# Patient Record
Sex: FEMALE | Race: WHITE | NOT HISPANIC OR LATINO
[De-identification: names, ages, dates, MRNs, and addresses within clinical notes are randomized per-mention and may not be internally consistent; named-entity substitution may affect disease eponyms.]

---

## 2021-12-29 ENCOUNTER — APPOINTMENT (OUTPATIENT)
Dept: GASTROENTEROLOGY | Facility: CLINIC | Age: 86
End: 2021-12-29
Payer: MEDICARE

## 2021-12-29 VITALS
HEIGHT: 66 IN | BODY MASS INDEX: 24.75 KG/M2 | WEIGHT: 154 LBS | SYSTOLIC BLOOD PRESSURE: 122 MMHG | DIASTOLIC BLOOD PRESSURE: 70 MMHG | HEART RATE: 59 BPM

## 2021-12-29 PROCEDURE — 99203 OFFICE O/P NEW LOW 30 MIN: CPT

## 2021-12-29 NOTE — ASSESSMENT
[FreeTextEntry1] : 89 yo female with history of chronic constipation. Lifestyle modifications discussed with patient and daughter. Will initiate therapy with Linzess 145 micrograms daily, and prn MOM. Follow up in one month.

## 2021-12-29 NOTE — HISTORY OF PRESENT ILLNESS
[de-identified] : Ms. PHONG ALEJO is a 89 year old female with history of chronic constipation. Patient has had issues for many years. Last colonoscopy was ten years ago. There is minimal complaints of bleeding. Patient does note bloating which is progressive as the day goes on. There has been no relief with the use of several laxatives including milk of magnesia and stool softeners. There is no family history of colon cancer.\par

## 2021-12-29 NOTE — REASON FOR VISIT
[Consultation] : a consultation visit [Family Member] : family member [FreeTextEntry1] : constipation

## 2022-03-30 ENCOUNTER — APPOINTMENT (OUTPATIENT)
Dept: GASTROENTEROLOGY | Facility: CLINIC | Age: 87
End: 2022-03-30
Payer: SELF-PAY

## 2022-03-30 DIAGNOSIS — K59.09 OTHER CONSTIPATION: ICD-10-CM

## 2022-03-30 PROCEDURE — 99442: CPT | Mod: 95

## 2022-03-30 NOTE — REASON FOR VISIT
[Home] : at home, [unfilled] , at the time of the visit. [Medical Office: ([facility])___] : at the medical office located in  [Verbal consent obtained from patient] : the patient, [unfilled] [Follow-Up: _____] : a [unfilled] follow-up visit

## 2022-03-30 NOTE — HISTORY OF PRESENT ILLNESS
[de-identified] : Ms. PHONG ALEJO is a 89 year old female with history of chronic constipation. Patient was intolerant of Linzess as it resulted in diarrhea. Patient was started on Amitiza, and has noted improvement and constipation although she did have one episode of diarrhea. There has been no bleeding or weight loss. Appetite has been good. \par

## 2022-03-30 NOTE — ASSESSMENT
[FreeTextEntry1] : 89 yo female with history of chronic constipation. Will adjust Amitiza to once daily. Patient advised to increase water and fiber intake. Patient to call back if diarrhea develops.\par \par Eleven minutes were spent in telephone consultation with patient.

## 2023-03-21 ENCOUNTER — INPATIENT (INPATIENT)
Facility: HOSPITAL | Age: 88
LOS: 2 days | Discharge: HOME HEALTH | DRG: 605 | End: 2023-03-24
Attending: NEUROLOGICAL SURGERY | Admitting: NEUROLOGICAL SURGERY
Payer: MEDICARE

## 2023-03-21 VITALS
OXYGEN SATURATION: 94 % | HEART RATE: 82 BPM | SYSTOLIC BLOOD PRESSURE: 180 MMHG | DIASTOLIC BLOOD PRESSURE: 76 MMHG | TEMPERATURE: 99 F | RESPIRATION RATE: 18 BRPM

## 2023-03-21 LAB
ALBUMIN SERPL ELPH-MCNC: 3.2 G/DL — LOW (ref 3.3–5)
ALP SERPL-CCNC: 61 U/L — SIGNIFICANT CHANGE UP (ref 40–120)
ALT FLD-CCNC: 9 U/L — LOW (ref 12–78)
ANION GAP SERPL CALC-SCNC: 7 MMOL/L — SIGNIFICANT CHANGE UP (ref 5–17)
APTT BLD: 29 SEC — SIGNIFICANT CHANGE UP (ref 27.5–35.5)
AST SERPL-CCNC: 21 U/L — SIGNIFICANT CHANGE UP (ref 15–37)
BASOPHILS # BLD AUTO: 0.04 K/UL — SIGNIFICANT CHANGE UP (ref 0–0.2)
BASOPHILS NFR BLD AUTO: 0.4 % — SIGNIFICANT CHANGE UP (ref 0–2)
BILIRUB SERPL-MCNC: 0.4 MG/DL — SIGNIFICANT CHANGE UP (ref 0.2–1.2)
BLD GP AB SCN SERPL QL: SIGNIFICANT CHANGE UP
BUN SERPL-MCNC: 21 MG/DL — SIGNIFICANT CHANGE UP (ref 7–23)
CALCIUM SERPL-MCNC: 8.9 MG/DL — SIGNIFICANT CHANGE UP (ref 8.5–10.1)
CHLORIDE SERPL-SCNC: 108 MMOL/L — SIGNIFICANT CHANGE UP (ref 96–108)
CO2 SERPL-SCNC: 26 MMOL/L — SIGNIFICANT CHANGE UP (ref 22–31)
CREAT SERPL-MCNC: 0.79 MG/DL — SIGNIFICANT CHANGE UP (ref 0.5–1.3)
EGFR: 70 ML/MIN/1.73M2 — SIGNIFICANT CHANGE UP
EOSINOPHIL # BLD AUTO: 0.05 K/UL — SIGNIFICANT CHANGE UP (ref 0–0.5)
EOSINOPHIL NFR BLD AUTO: 0.5 % — SIGNIFICANT CHANGE UP (ref 0–6)
GLUCOSE SERPL-MCNC: 100 MG/DL — HIGH (ref 70–99)
HCT VFR BLD CALC: 38.7 % — SIGNIFICANT CHANGE UP (ref 34.5–45)
HGB BLD-MCNC: 12.2 G/DL — SIGNIFICANT CHANGE UP (ref 11.5–15.5)
IMM GRANULOCYTES NFR BLD AUTO: 0.6 % — SIGNIFICANT CHANGE UP (ref 0–0.9)
INR BLD: 1.02 RATIO — SIGNIFICANT CHANGE UP (ref 0.88–1.16)
LYMPHOCYTES # BLD AUTO: 1.03 K/UL — SIGNIFICANT CHANGE UP (ref 1–3.3)
LYMPHOCYTES # BLD AUTO: 9.8 % — LOW (ref 13–44)
MCHC RBC-ENTMCNC: 29.5 PG — SIGNIFICANT CHANGE UP (ref 27–34)
MCHC RBC-ENTMCNC: 31.5 GM/DL — LOW (ref 32–36)
MCV RBC AUTO: 93.5 FL — SIGNIFICANT CHANGE UP (ref 80–100)
MONOCYTES # BLD AUTO: 0.8 K/UL — SIGNIFICANT CHANGE UP (ref 0–0.9)
MONOCYTES NFR BLD AUTO: 7.6 % — SIGNIFICANT CHANGE UP (ref 2–14)
NEUTROPHILS # BLD AUTO: 8.54 K/UL — HIGH (ref 1.8–7.4)
NEUTROPHILS NFR BLD AUTO: 81.1 % — HIGH (ref 43–77)
PLATELET # BLD AUTO: 192 K/UL — SIGNIFICANT CHANGE UP (ref 150–400)
POTASSIUM SERPL-MCNC: 4.7 MMOL/L — SIGNIFICANT CHANGE UP (ref 3.5–5.3)
POTASSIUM SERPL-SCNC: 4.7 MMOL/L — SIGNIFICANT CHANGE UP (ref 3.5–5.3)
PROT SERPL-MCNC: 7.8 GM/DL — SIGNIFICANT CHANGE UP (ref 6–8.3)
PROTHROM AB SERPL-ACNC: 11.8 SEC — SIGNIFICANT CHANGE UP (ref 10.5–13.4)
RBC # BLD: 4.14 M/UL — SIGNIFICANT CHANGE UP (ref 3.8–5.2)
RBC # FLD: 14.4 % — SIGNIFICANT CHANGE UP (ref 10.3–14.5)
SODIUM SERPL-SCNC: 141 MMOL/L — SIGNIFICANT CHANGE UP (ref 135–145)
WBC # BLD: 10.52 K/UL — HIGH (ref 3.8–10.5)
WBC # FLD AUTO: 10.52 K/UL — HIGH (ref 3.8–10.5)

## 2023-03-21 PROCEDURE — 71250 CT THORAX DX C-: CPT | Mod: 26,MA

## 2023-03-21 PROCEDURE — 76376 3D RENDER W/INTRP POSTPROCES: CPT | Mod: 26

## 2023-03-21 PROCEDURE — 72125 CT NECK SPINE W/O DYE: CPT | Mod: 26,MA

## 2023-03-21 PROCEDURE — 70450 CT HEAD/BRAIN W/O DYE: CPT | Mod: 26,MA

## 2023-03-21 PROCEDURE — 74176 CT ABD & PELVIS W/O CONTRAST: CPT | Mod: 26,MA

## 2023-03-21 PROCEDURE — 73552 X-RAY EXAM OF FEMUR 2/>: CPT | Mod: 26,LT

## 2023-03-21 PROCEDURE — 93010 ELECTROCARDIOGRAM REPORT: CPT

## 2023-03-21 PROCEDURE — 99285 EMERGENCY DEPT VISIT HI MDM: CPT | Mod: 25

## 2023-03-21 PROCEDURE — 73562 X-RAY EXAM OF KNEE 3: CPT | Mod: 26,LT

## 2023-03-21 PROCEDURE — 12011 RPR F/E/E/N/L/M 2.5 CM/<: CPT

## 2023-03-21 PROCEDURE — 70486 CT MAXILLOFACIAL W/O DYE: CPT | Mod: 26,MA

## 2023-03-21 PROCEDURE — 73590 X-RAY EXAM OF LOWER LEG: CPT | Mod: 26,LT

## 2023-03-21 RX ORDER — ACETAMINOPHEN 500 MG
1000 TABLET ORAL ONCE
Refills: 0 | Status: COMPLETED | OUTPATIENT
Start: 2023-03-21 | End: 2023-03-21

## 2023-03-21 RX ORDER — TETANUS TOXOID, REDUCED DIPHTHERIA TOXOID AND ACELLULAR PERTUSSIS VACCINE, ADSORBED 5; 2.5; 8; 8; 2.5 [IU]/.5ML; [IU]/.5ML; UG/.5ML; UG/.5ML; UG/.5ML
0.5 SUSPENSION INTRAMUSCULAR ONCE
Refills: 0 | Status: COMPLETED | OUTPATIENT
Start: 2023-03-21 | End: 2023-03-21

## 2023-03-21 RX ADMIN — Medication 1000 MILLIGRAM(S): at 23:35

## 2023-03-21 RX ADMIN — Medication 400 MILLIGRAM(S): at 22:50

## 2023-03-21 NOTE — ED PROVIDER NOTE - PHYSICAL EXAMINATION
Physical Exam:  Gen: NAD, non-toxic appearing, able to ambulate without assistance  Head: NCAT  HEENT: EOMI, PEERLA, normal conjunctiva, tongue midline, oral mucosa moist  Lung: CTAB, no respiratory distress, no wheezes/rhonchi/rales B/L, speaking in full sentences  CV: RRR, no murmurs, rubs or gallops, distal pulses 2+ b/l  Abd: soft, nontender, no distention, no guarding, no rigidity, no rebound tenderness  MSK: +b/l chest wall TTP. No crepitance. +L knee abrasion with TTP, mild swelling, and decreased ROM secondary to pain.  Skin: Warm, well perfused, no rash. +bruising to nasal bridge. +1cm laceration to chin, no bleeding, no foreign body.   Psych: normal affect, calm

## 2023-03-21 NOTE — ED PROVIDER NOTE - CLINICAL SUMMARY MEDICAL DECISION MAKING FREE TEXT BOX
Patient with head injury, small hyperdense area small hemorrhage vs cavernova,  xr knee without fracture, CTs otherwise show evidence of metastatic disease.  Laceration repaired in the ED.  patient ultimately admitted to neuro surgery service under SICU.

## 2023-03-21 NOTE — ED PROVIDER NOTE - PROGRESS NOTE DETAILS
Juan LAM: s/o received from Dr. Simmons, pending CT reads. all CTs and findings d/w patient and family at bedside. NSx eval obtained for possible hemorrhage in Laura. recommending admission and rept CT head in 6 hours from initial. will admit.

## 2023-03-21 NOTE — ED ADULT TRIAGE NOTE - CHIEF COMPLAINT QUOTE
pt bibems  from home s/p trip and fall from standing height onto face on concrete at 3:30pm. -loc -blood thinner. abrasion to bridge of nose and lips. pmh vertigo, parkinson's. pt c/o L knee pain upon ambulation

## 2023-03-21 NOTE — ED ADULT NURSE REASSESSMENT NOTE - NS ED NURSE REASSESS COMMENT FT1
Pt assessed at bedside, per MD Simmons, made a trauma alert. Pt taken to trauma room, placed on telemetry monitor, pt assessed per trauma flowsheet charting.

## 2023-03-21 NOTE — ED PROVIDER NOTE - CARE PLAN
1 Principal Discharge DX:	Head injury   Principal Discharge DX:	Head injury  Secondary Diagnosis:	Facial laceration  Secondary Diagnosis:	Knee pain, left  Secondary Diagnosis:	Metastatic disease

## 2023-03-21 NOTE — ED PROVIDER NOTE - OBJECTIVE STATEMENT
89 y/o female PMHx of vertigo, parkinson's presents to ED bibems  from home s/p mechanical trip and fall from standing height onto face on concrete at 3:30pm c/o face pain, L knee pain, b/l chest wall pain. -loc, -blood thinner. +abrasion to bridge of nose and lips. 89 y/o female PMHx of vertigo, parkinson's presents to ED bibems  from home s/p mechanical trip and fall from standing height onto face on concrete at 3:30pm today c/o face pain, L knee pain, b/l chest wall pain. -loc, -blood thinner. +abrasion to bridge of nose and lips.

## 2023-03-22 DIAGNOSIS — S09.90XA UNSPECIFIED INJURY OF HEAD, INITIAL ENCOUNTER: ICD-10-CM

## 2023-03-22 LAB
ANION GAP SERPL CALC-SCNC: 2 MMOL/L — LOW (ref 5–17)
APPEARANCE UR: CLEAR — SIGNIFICANT CHANGE UP
BACTERIA # UR AUTO: ABNORMAL
BILIRUB UR-MCNC: NEGATIVE — SIGNIFICANT CHANGE UP
BUN SERPL-MCNC: 18 MG/DL — SIGNIFICANT CHANGE UP (ref 7–23)
CALCIUM SERPL-MCNC: 9 MG/DL — SIGNIFICANT CHANGE UP (ref 8.5–10.1)
CHLORIDE SERPL-SCNC: 107 MMOL/L — SIGNIFICANT CHANGE UP (ref 96–108)
CO2 SERPL-SCNC: 30 MMOL/L — SIGNIFICANT CHANGE UP (ref 22–31)
COLOR SPEC: YELLOW — SIGNIFICANT CHANGE UP
CREAT SERPL-MCNC: 0.84 MG/DL — SIGNIFICANT CHANGE UP (ref 0.5–1.3)
DIFF PNL FLD: ABNORMAL
EGFR: 65 ML/MIN/1.73M2 — SIGNIFICANT CHANGE UP
EPI CELLS # UR: SIGNIFICANT CHANGE UP
FLUAV AG NPH QL: SIGNIFICANT CHANGE UP
FLUBV AG NPH QL: SIGNIFICANT CHANGE UP
GLUCOSE SERPL-MCNC: 109 MG/DL — HIGH (ref 70–99)
GLUCOSE UR QL: NEGATIVE — SIGNIFICANT CHANGE UP
HCT VFR BLD CALC: 38 % — SIGNIFICANT CHANGE UP (ref 34.5–45)
HGB BLD-MCNC: 12.2 G/DL — SIGNIFICANT CHANGE UP (ref 11.5–15.5)
KETONES UR-MCNC: NEGATIVE — SIGNIFICANT CHANGE UP
LEUKOCYTE ESTERASE UR-ACNC: ABNORMAL
MAGNESIUM SERPL-MCNC: 2.3 MG/DL — SIGNIFICANT CHANGE UP (ref 1.6–2.6)
MCHC RBC-ENTMCNC: 29.6 PG — SIGNIFICANT CHANGE UP (ref 27–34)
MCHC RBC-ENTMCNC: 32.1 GM/DL — SIGNIFICANT CHANGE UP (ref 32–36)
MCV RBC AUTO: 92.2 FL — SIGNIFICANT CHANGE UP (ref 80–100)
NITRITE UR-MCNC: NEGATIVE — SIGNIFICANT CHANGE UP
PH UR: 6.5 — SIGNIFICANT CHANGE UP (ref 5–8)
PHOSPHATE SERPL-MCNC: 3.4 MG/DL — SIGNIFICANT CHANGE UP (ref 2.5–4.5)
PLATELET # BLD AUTO: 200 K/UL — SIGNIFICANT CHANGE UP (ref 150–400)
POTASSIUM SERPL-MCNC: 4 MMOL/L — SIGNIFICANT CHANGE UP (ref 3.5–5.3)
POTASSIUM SERPL-SCNC: 4 MMOL/L — SIGNIFICANT CHANGE UP (ref 3.5–5.3)
PROT UR-MCNC: 15
RBC # BLD: 4.12 M/UL — SIGNIFICANT CHANGE UP (ref 3.8–5.2)
RBC # FLD: 14.2 % — SIGNIFICANT CHANGE UP (ref 10.3–14.5)
RBC CASTS # UR COMP ASSIST: ABNORMAL /HPF (ref 0–4)
RSV RNA NPH QL NAA+NON-PROBE: SIGNIFICANT CHANGE UP
SARS-COV-2 RNA SPEC QL NAA+PROBE: SIGNIFICANT CHANGE UP
SODIUM SERPL-SCNC: 139 MMOL/L — SIGNIFICANT CHANGE UP (ref 135–145)
SP GR SPEC: 1.01 — SIGNIFICANT CHANGE UP (ref 1.01–1.02)
UROBILINOGEN FLD QL: NEGATIVE — SIGNIFICANT CHANGE UP
WBC # BLD: 9.77 K/UL — SIGNIFICANT CHANGE UP (ref 3.8–10.5)
WBC # FLD AUTO: 9.77 K/UL — SIGNIFICANT CHANGE UP (ref 3.8–10.5)
WBC UR QL: SIGNIFICANT CHANGE UP /HPF (ref 0–5)

## 2023-03-22 PROCEDURE — 93306 TTE W/DOPPLER COMPLETE: CPT | Mod: 26

## 2023-03-22 PROCEDURE — 97116 GAIT TRAINING THERAPY: CPT | Mod: GP

## 2023-03-22 PROCEDURE — 99222 1ST HOSP IP/OBS MODERATE 55: CPT

## 2023-03-22 PROCEDURE — 85027 COMPLETE CBC AUTOMATED: CPT

## 2023-03-22 PROCEDURE — 97530 THERAPEUTIC ACTIVITIES: CPT | Mod: GP

## 2023-03-22 PROCEDURE — 99223 1ST HOSP IP/OBS HIGH 75: CPT

## 2023-03-22 PROCEDURE — 70553 MRI BRAIN STEM W/O & W/DYE: CPT

## 2023-03-22 PROCEDURE — A9579: CPT

## 2023-03-22 PROCEDURE — 80061 LIPID PANEL: CPT

## 2023-03-22 PROCEDURE — 84100 ASSAY OF PHOSPHORUS: CPT

## 2023-03-22 PROCEDURE — 83735 ASSAY OF MAGNESIUM: CPT

## 2023-03-22 PROCEDURE — 97162 PT EVAL MOD COMPLEX 30 MIN: CPT | Mod: GP

## 2023-03-22 PROCEDURE — 80048 BASIC METABOLIC PNL TOTAL CA: CPT

## 2023-03-22 PROCEDURE — 74183 MRI ABD W/O CNTR FLWD CNTR: CPT

## 2023-03-22 PROCEDURE — 36415 COLL VENOUS BLD VENIPUNCTURE: CPT

## 2023-03-22 PROCEDURE — 70450 CT HEAD/BRAIN W/O DYE: CPT | Mod: 26

## 2023-03-22 PROCEDURE — 93306 TTE W/DOPPLER COMPLETE: CPT

## 2023-03-22 PROCEDURE — 70450 CT HEAD/BRAIN W/O DYE: CPT

## 2023-03-22 RX ORDER — MIRTAZAPINE 45 MG/1
7.5 TABLET, ORALLY DISINTEGRATING ORAL AT BEDTIME
Refills: 0 | Status: DISCONTINUED | OUTPATIENT
Start: 2023-03-22 | End: 2023-03-24

## 2023-03-22 RX ORDER — CARBIDOPA AND LEVODOPA 25; 100 MG/1; MG/1
1.5 TABLET ORAL
Refills: 0 | Status: DISCONTINUED | OUTPATIENT
Start: 2023-03-22 | End: 2023-03-24

## 2023-03-22 RX ORDER — FAMOTIDINE 10 MG/ML
20 INJECTION INTRAVENOUS EVERY 12 HOURS
Refills: 0 | Status: DISCONTINUED | OUTPATIENT
Start: 2023-03-22 | End: 2023-03-24

## 2023-03-22 RX ORDER — SERTRALINE 25 MG/1
75 TABLET, FILM COATED ORAL DAILY
Refills: 0 | Status: DISCONTINUED | OUTPATIENT
Start: 2023-03-22 | End: 2023-03-24

## 2023-03-22 RX ORDER — CARBIDOPA AND LEVODOPA 25; 100 MG/1; MG/1
2 TABLET ORAL
Refills: 0 | Status: DISCONTINUED | OUTPATIENT
Start: 2023-03-22 | End: 2023-03-24

## 2023-03-22 RX ORDER — CARBIDOPA AND LEVODOPA 25; 100 MG/1; MG/1
2 TABLET ORAL
Qty: 0 | Refills: 0 | DISCHARGE

## 2023-03-22 RX ORDER — SENNA PLUS 8.6 MG/1
2 TABLET ORAL AT BEDTIME
Refills: 0 | Status: DISCONTINUED | OUTPATIENT
Start: 2023-03-22 | End: 2023-03-24

## 2023-03-22 RX ORDER — FOLIC ACID 0.8 MG
1 TABLET ORAL DAILY
Refills: 0 | Status: DISCONTINUED | OUTPATIENT
Start: 2023-03-22 | End: 2023-03-24

## 2023-03-22 RX ORDER — CARBIDOPA AND LEVODOPA 25; 100 MG/1; MG/1
1 TABLET ORAL
Qty: 0 | Refills: 0 | DISCHARGE

## 2023-03-22 RX ORDER — ONDANSETRON 8 MG/1
4 TABLET, FILM COATED ORAL EVERY 6 HOURS
Refills: 0 | Status: DISCONTINUED | OUTPATIENT
Start: 2023-03-22 | End: 2023-03-24

## 2023-03-22 RX ORDER — ACETAMINOPHEN 500 MG
1000 TABLET ORAL EVERY 6 HOURS
Refills: 0 | Status: DISCONTINUED | OUTPATIENT
Start: 2023-03-22 | End: 2023-03-24

## 2023-03-22 RX ORDER — SODIUM CHLORIDE 9 MG/ML
1000 INJECTION INTRAMUSCULAR; INTRAVENOUS; SUBCUTANEOUS
Refills: 0 | Status: DISCONTINUED | OUTPATIENT
Start: 2023-03-22 | End: 2023-03-23

## 2023-03-22 RX ADMIN — FAMOTIDINE 20 MILLIGRAM(S): 10 INJECTION INTRAVENOUS at 22:47

## 2023-03-22 RX ADMIN — Medication 1 MILLIGRAM(S): at 10:28

## 2023-03-22 RX ADMIN — MIRTAZAPINE 7.5 MILLIGRAM(S): 45 TABLET, ORALLY DISINTEGRATING ORAL at 23:27

## 2023-03-22 RX ADMIN — SODIUM CHLORIDE 75 MILLILITER(S): 9 INJECTION INTRAMUSCULAR; INTRAVENOUS; SUBCUTANEOUS at 23:28

## 2023-03-22 RX ADMIN — CARBIDOPA AND LEVODOPA 2 TABLET(S): 25; 100 TABLET ORAL at 19:31

## 2023-03-22 RX ADMIN — CARBIDOPA AND LEVODOPA 1.5 TABLET(S): 25; 100 TABLET ORAL at 13:18

## 2023-03-22 RX ADMIN — SODIUM CHLORIDE 75 MILLILITER(S): 9 INJECTION INTRAMUSCULAR; INTRAVENOUS; SUBCUTANEOUS at 06:40

## 2023-03-22 RX ADMIN — CARBIDOPA AND LEVODOPA 1.5 TABLET(S): 25; 100 TABLET ORAL at 22:48

## 2023-03-22 RX ADMIN — Medication 1 TABLET(S): at 10:28

## 2023-03-22 RX ADMIN — SENNA PLUS 2 TABLET(S): 8.6 TABLET ORAL at 22:47

## 2023-03-22 RX ADMIN — FAMOTIDINE 20 MILLIGRAM(S): 10 INJECTION INTRAVENOUS at 10:27

## 2023-03-22 RX ADMIN — SERTRALINE 75 MILLIGRAM(S): 25 TABLET, FILM COATED ORAL at 21:06

## 2023-03-22 NOTE — H&P ADULT - HISTORY OF PRESENT ILLNESS
Neurosurgery called 1:27am  Arrival to patient 1:40am    Patient is a 89 y/o female PMHx of vertigo, parkinson's presents to ED bibems  from home s/p mechanical trip and fall from standing height onto face on concrete at 3:30pm today c/o face pain, L knee pain, b/l chest wall pain. She denies LOC. GCS 15 E:4 M:6 V:5. She states she has a history of frequent falls due to her vertigo. Typically ambulates with walker but did not have it with her when she was outside. Patient sitting up in stretcher, complains of left knee pain. She denies numbness/tingling, weakness, visual changes, dizziness.

## 2023-03-22 NOTE — PHYSICAL THERAPY INITIAL EVALUATION ADULT - GENERAL OBSERVATIONS, REHAB EVAL
Pt rec'd supine in bed, pleasant and cooperative with PT, no c/o pain but endorses left knee stiffness.

## 2023-03-22 NOTE — H&P ADULT - NSHPPHYSICALEXAM_GEN_ALL_CORE
Constitutional: awake and alert.  Eyes: anicteric sclerae, moist conjunctivae, PERRLA  HENT: + lac chin sutured in ER c/d/i, dried blood to upper lip and nares, oropharynx clear dry mucous membranes  Neck: Supple  Respiratory: Breath sounds are clear bilaterally  Cardiovascular: S1 and S2, regular  rhythm  Gastrointestinal: soft, nontender  Extremities:  no edema  Musculoskeletal: + TTP L knee with ROM no erythema small abrasion noted without bleeding  Skin: No rashes  Psych: Appropriately interactive, normal affect    Neurological exam:  HF: A x O x 3. Appropriately interactive, normal affect. Speech fluent, No Aphasia or paraphasic errors. Naming /repetition intact   CN: LATRICIA, EOMI, VFF, facial sensation normal, no NLFD, tongue midline  Motor: No pronator drift, Strength 5/5 except bl LE lifts antigravity off stretcher  Sens: Intact to light touch  Reflexes: Symmetric and normal, downgoing toes b/l  Coord:  No FNFA, dysmetria, KEITH intact   Gait/Balance: Cannot test No

## 2023-03-22 NOTE — CONSULT NOTE ADULT - ASSESSMENT
89 y/o F with PMHx of vertigo and Parkinson's found to have numerous lung nodules and hepatic lesion on CT A/P s/p mechanical trip and fall with head trauma    # Scattered Bilateral Pulmonary Nodules & Hepatic Lesions    - CT A/P imaging revealed: Numerous scattered bilateral pulmonary nodules suspicious for metastatic disease. The largest is in the left lower lobe and measures 1.7 cm. Next largest is in the right upper lobe and measures 1.1 cm. There are several hepatic lesions which are indeterminant. Suspicious for metastatic disease.  - Planning to receive MR abdomen and head with/without contrast  - Recommend IR consult for anticipated tissue bx  - Will speak with patient's daughter regarding GOC as patient agreeable to full work up to identify diagnosis  - Continue supportive measures  89 y/o F with PMHx of vertigo and Parkinson's found to have numerous lung nodules and hepatic lesion on CT A/P s/p mechanical trip and fall with head trauma    # Scattered Bilateral Pulmonary Nodules & Hepatic Lesions    - CT A/P imaging revealed: Numerous scattered bilateral pulmonary nodules suspicious for metastatic disease. The largest is in the left lower lobe and measures 1.7 cm. Next largest is in the right upper lobe and measures 1.1 cm. There are several hepatic lesions which are indeterminant. Suspicious for metastatic disease.  - Obtain MRI of abdomen with contrast  to better evaluate liver lesions r/o mets. If MRI confirms that hepatic lesions likely malignant- will need liver biopsy ( prefer rather than lung biopsy)- if patient desires to proceed with diagnostic w/up.   - Recommend IR consult for anticipated tissue bx  - Will speak with patient's daughter regarding GOC as patient agreeable to full work up to identify diagnosis  - Continue supportive measures     Hem Onc attending.   Patient evaluated, imaging reviewed Discussed with Hem Onc PA. Plan as above to obtain additional imaging and diagnostic biopsy if patient wants  to proceed .

## 2023-03-22 NOTE — H&P ADULT - ASSESSMENT
Patient is a 89 y/o female PMHx of vertigo, parkinson's presents to ED bibems  from home s/p mechanical trip and fall from standing height onto face on concrete at 3:30pm today c/o face pain, L knee pain, b/l chest wall pain. HCT revealed 5mm focus hyperdensity anterior stella, hemorrhage vs cavernoma. No other traumatic injuries.    Plan:  - No acute neurosurgical intervention  - Admit for observation stepdown  - Neuro checks [id number]  - RHCT this AM or sooner if acute change in MS  - Patient defers MRI Brain   - SBP <150  - Pain control  - CT C/A/P multiple hepatic lung nodule per patient/daughter has been aware of may need to perform with contrast  - Critical care consult  - SCDs DVT ppx    Discussed with Dr. Mack

## 2023-03-22 NOTE — PATIENT PROFILE ADULT - FALL HARM RISK - HARM RISK INTERVENTIONS

## 2023-03-22 NOTE — H&P ADULT - NSHPLABSRESULTS_GEN_ALL_CORE
CT Head No Cont (03.21.23 @ 22:27)   Nonspecific 5 mm focus of increased density within the anterior stella   without significant adjacent edema or mass effect. This may represent a   small focal hemorrhage versus a cavernoma. Short-term follow-up CT can be   obtained. Alternatively, further evaluation with MRI of the brain can be   obtained.    Otherwise, there is no other evidence for intracranial bleed, mass effect   or depressed skull fracture.      CT FACIAL BONES: No acute fracture or acute dislocation.    Question collapse of the left posterior nasopharynx, however underlying   mass cannot be excluded. Recommend direct inspection.      CT CERVICAL SPINE: No acute fracture or acute subluxation.

## 2023-03-22 NOTE — PROGRESS NOTE ADULT - ASSESSMENT
Patient is a 89 y/o female PMHx of vertigo, parkinson's presents to ED bibems  from home s/p mechanical trip and fall from standing height onto face on concrete at 3:30pm today c/o face pain, L knee pain, b/l chest wall pain. HCT revealed 5mm focus hyperdensity anterior laura, hemorrhage vs cavernoma. No other traumatic injuries.    # Anterior Laura Hemorrhage   # b/l lung lesions  # Liver lesions   # Parkinson's  # Vertigo     Plan:  Advance diet and activity as tolerated   Pt has agreed to MR +/- brain, chest, abd with sedation for diagnostic purposes   Daughter is requesting heme/onc consult   check orthostatic BP   Physical therapy   Needs stroke core measures: TTE, Lipids, DVT PPX, dysphagia screen   Venodynes for DVT PPX         Discussed with Dr. Mack

## 2023-03-22 NOTE — CONSULT NOTE ADULT - ASSESSMENT
ASSESSMENT  89 yo female PMHx of vertigo, parkinson's presents to ED bibems  from home s/p mechanical trip and fall from standing height onto face on concrete at 3:30pm today c/o face pain, L knee pain, b/l chest wall pain. She denies LOC.    CTH with 5mm hyperdensity in anterior stella hemorrhage vs cavernona  Rpt CTH stable  CTAP revealing multiple pulmonary nodules suspicious for metastasis, also liver lesions    PLAN  - Mentation intact. rpt CTH stable. no neurosx intervention  - BP stable. cont to monitor  - check orthostatics  - check TTE  - incidental findings of mulitple pulm and liver lesions suspicious for metastasis. pt and daughter Joy denies hx of cancer, unintentional weight loss, hemoptysis, state pt quit smoking when she was 26yo. daughter denies that pt had any abnl mammograms, colonoscopies but not completely sure.  - daughter requesting heme/onc consult  - will get further imaging MR head, abdomen and CT chest with IV contrast to further eval nodules  - PO diet  - Cr stable. monitor I & os  - no chemical DVT ppx d/t bleeding risk  - PT eval for frequent falls    Will discuss with Dr. Barajas

## 2023-03-22 NOTE — PHYSICAL THERAPY INITIAL EVALUATION ADULT - PERTINENT HX OF CURRENT PROBLEM, REHAB EVAL
89 y/o female PMHx of vertigo, parkinson's presents to ED bibems  from home s/p mechanical trip and fall from standing height onto face on concrete at 3:30pm today c/o face pain, L knee pain, b/l chest wall pain. HCT revealed 5mm focus hyperdensity anterior stella, hemorrhage vs cavernoma.

## 2023-03-22 NOTE — PHYSICAL THERAPY INITIAL EVALUATION ADULT - STANDING BALANCE: DYNAMIC, REHAB EVAL
Herberth returned call. Agreeable to ECG today, will head over now. Also discussed to stop at outpatient imaging on first floor for lab draw after ECG is completed.  
Last office visit: 4/14/22    Next scheduled/on recall list: Neither - due for 6 month follow up (~10/2022) for monitoring of Dofetilide therapy    Medication/dose: Dofetilide 500 mg BID    Quantity/#refills: 180/0     Medication related testing: CMP 1/17/23, Mag 4/14/22, 4/14/22    Refill request completed? No        Spoke with patient, states he has a little less than a month of medication left. Agreeable to being transferred to schedule follow up. Aware we are booking out several months so depending on when follow up is scheduled will fill medication depending on this visit. May need lab work / EKG in the meantime since magnesium and EKG were last in April 2022.     Scheduled with Rosaura PRAJAPATI 3/29/23.       Rosaura - okay to order magnesium and EKG?       EP triage - once magnesium and EKG are completed, as long as they are stable, please fill Dofetilide for 180/0 to get patient to follow up appointment. Patient states he has a little less than a month of medication left, aware of  importance of not missing any doses.     
Looks good, follow-up as scheduled  
Mag normal also  
Mg and EKG orders placed.   Call to PAVEL Kevin #1.     With returned call please get in for ASAP EKG per below and advise on Mg level. These orders are placed. When he schedules the EKG- ok to send refill in per MS below.  
Pura: ECG placed on your desk for dofetilide monitoring.   
Refill request fulfilled. Writer did not call patient with results or enter edit EKG as not in EP clinic today.   
Relayed ECG and mag results to Valentina   
Yes, please order magnesium level (unfortunately it is too late to add on to recent labs).  Also obtain ECG.  I do not want him to miss any doses, hopefully he can get in ASAP.  If he has mail order medications, I am okay with you sending in enough to get him through into the appointment, even prior to testing being completed.  Magnesium was stable in the past.  We can always call and make any changes if needed.  
fair plus

## 2023-03-23 LAB
ANION GAP SERPL CALC-SCNC: 5 MMOL/L — SIGNIFICANT CHANGE UP (ref 5–17)
BUN SERPL-MCNC: 15 MG/DL — SIGNIFICANT CHANGE UP (ref 7–23)
CALCIUM SERPL-MCNC: 8.5 MG/DL — SIGNIFICANT CHANGE UP (ref 8.5–10.1)
CHLORIDE SERPL-SCNC: 113 MMOL/L — HIGH (ref 96–108)
CHOLEST SERPL-MCNC: 143 MG/DL — SIGNIFICANT CHANGE UP
CO2 SERPL-SCNC: 25 MMOL/L — SIGNIFICANT CHANGE UP (ref 22–31)
CREAT SERPL-MCNC: 0.64 MG/DL — SIGNIFICANT CHANGE UP (ref 0.5–1.3)
CULTURE RESULTS: SIGNIFICANT CHANGE UP
EGFR: 83 ML/MIN/1.73M2 — SIGNIFICANT CHANGE UP
GLUCOSE SERPL-MCNC: 89 MG/DL — SIGNIFICANT CHANGE UP (ref 70–99)
HCT VFR BLD CALC: 36.7 % — SIGNIFICANT CHANGE UP (ref 34.5–45)
HDLC SERPL-MCNC: 47 MG/DL — LOW
HGB BLD-MCNC: 11.7 G/DL — SIGNIFICANT CHANGE UP (ref 11.5–15.5)
LIPID PNL WITH DIRECT LDL SERPL: 81 MG/DL — SIGNIFICANT CHANGE UP
MAGNESIUM SERPL-MCNC: 2.2 MG/DL — SIGNIFICANT CHANGE UP (ref 1.6–2.6)
MCHC RBC-ENTMCNC: 29.8 PG — SIGNIFICANT CHANGE UP (ref 27–34)
MCHC RBC-ENTMCNC: 31.9 GM/DL — LOW (ref 32–36)
MCV RBC AUTO: 93.4 FL — SIGNIFICANT CHANGE UP (ref 80–100)
NON HDL CHOLESTEROL: 96 MG/DL — SIGNIFICANT CHANGE UP
PHOSPHATE SERPL-MCNC: 2.8 MG/DL — SIGNIFICANT CHANGE UP (ref 2.5–4.5)
PLATELET # BLD AUTO: 180 K/UL — SIGNIFICANT CHANGE UP (ref 150–400)
POTASSIUM SERPL-MCNC: 3.7 MMOL/L — SIGNIFICANT CHANGE UP (ref 3.5–5.3)
POTASSIUM SERPL-SCNC: 3.7 MMOL/L — SIGNIFICANT CHANGE UP (ref 3.5–5.3)
RBC # BLD: 3.93 M/UL — SIGNIFICANT CHANGE UP (ref 3.8–5.2)
RBC # FLD: 14.5 % — SIGNIFICANT CHANGE UP (ref 10.3–14.5)
SODIUM SERPL-SCNC: 143 MMOL/L — SIGNIFICANT CHANGE UP (ref 135–145)
SPECIMEN SOURCE: SIGNIFICANT CHANGE UP
TRIGL SERPL-MCNC: 76 MG/DL — SIGNIFICANT CHANGE UP
WBC # BLD: 7.7 K/UL — SIGNIFICANT CHANGE UP (ref 3.8–10.5)
WBC # FLD AUTO: 7.7 K/UL — SIGNIFICANT CHANGE UP (ref 3.8–10.5)

## 2023-03-23 PROCEDURE — 74183 MRI ABD W/O CNTR FLWD CNTR: CPT | Mod: 26

## 2023-03-23 PROCEDURE — 99232 SBSQ HOSP IP/OBS MODERATE 35: CPT

## 2023-03-23 PROCEDURE — 70553 MRI BRAIN STEM W/O & W/DYE: CPT | Mod: 26

## 2023-03-23 PROCEDURE — 99233 SBSQ HOSP IP/OBS HIGH 50: CPT

## 2023-03-23 RX ADMIN — Medication 1 TABLET(S): at 09:20

## 2023-03-23 RX ADMIN — FAMOTIDINE 20 MILLIGRAM(S): 10 INJECTION INTRAVENOUS at 21:19

## 2023-03-23 RX ADMIN — Medication 0.25 MILLIGRAM(S): at 13:01

## 2023-03-23 RX ADMIN — Medication 1 MILLIGRAM(S): at 09:20

## 2023-03-23 RX ADMIN — CARBIDOPA AND LEVODOPA 1.5 TABLET(S): 25; 100 TABLET ORAL at 12:40

## 2023-03-23 RX ADMIN — FAMOTIDINE 20 MILLIGRAM(S): 10 INJECTION INTRAVENOUS at 09:20

## 2023-03-23 RX ADMIN — SERTRALINE 75 MILLIGRAM(S): 25 TABLET, FILM COATED ORAL at 21:19

## 2023-03-23 RX ADMIN — CARBIDOPA AND LEVODOPA 1.5 TABLET(S): 25; 100 TABLET ORAL at 21:19

## 2023-03-23 RX ADMIN — MIRTAZAPINE 7.5 MILLIGRAM(S): 45 TABLET, ORALLY DISINTEGRATING ORAL at 21:19

## 2023-03-23 RX ADMIN — CARBIDOPA AND LEVODOPA 2 TABLET(S): 25; 100 TABLET ORAL at 09:19

## 2023-03-23 RX ADMIN — CARBIDOPA AND LEVODOPA 2 TABLET(S): 25; 100 TABLET ORAL at 16:28

## 2023-03-23 NOTE — PROGRESS NOTE ADULT - ASSESSMENT
Patient is a 89 y/o female PMHx of vertigo, parkinson's presents to ED bibems  from home s/p mechanical trip and fall from standing height onto face on concrete at 3:30pm today c/o face pain, L knee pain, b/l chest wall pain. HCT revealed 5mm focus hyperdensity anterior laura, hemorrhage vs cavernoma. No other traumatic injuries.    # Anterior Laura Hemorrhage   # b/l lung lesions  # Liver lesions   # Parkinson's  # Vertigo     Plan:  Advance diet and activity as tolerated   MRI head and abdomen today   Heme/ Onc consult appreciated   check orthostatic BP   Physical therapy   Needs stroke core measures: TTE, Lipids, DVT PPX, dysphagia screen   Venodynes for DVT PPX         Discussed with Dr. Mack

## 2023-03-23 NOTE — PROGRESS NOTE ADULT - ASSESSMENT
ASSESSMENT  89 yo female PMHx of vertigo, parkinson's presents to ED bibems  from home s/p mechanical trip and fall from standing height onto face on concrete at 3:30pm today c/o face pain, L knee pain, b/l chest wall pain. She denies LOC.    CTH with 5mm hyperdensity in anterior stella hemorrhage vs cavernona  Rpt CTH stable  CTAP revealing multiple pulmonary nodules suspicious for metastasis, also liver lesions    PLAN  - Mentation intact. rpt CTH stable. no neurosx intervention  - BP stable. cont to monitor  - check orthostatics  - TTE - EF 55-60%. mild aortic sclerosis, mild to mod TR  - plan for MR head, abdomen today for  further eval nodules  - added ativan prn to tolerate MRI  - heme/onc consult appreciated  - PO diet  - Cr stable. monitor I & os  - no chemical DVT ppx d/t bleeding risk. cont SCDs  - PT eval for frequent falls    Dispo: Stable for floor. MRI head and abd today    Will discuss with Dr. Barajas

## 2023-03-24 ENCOUNTER — TRANSCRIPTION ENCOUNTER (OUTPATIENT)
Age: 88
End: 2023-03-24

## 2023-03-24 VITALS
RESPIRATION RATE: 16 BRPM | HEART RATE: 74 BPM | SYSTOLIC BLOOD PRESSURE: 111 MMHG | OXYGEN SATURATION: 94 % | DIASTOLIC BLOOD PRESSURE: 54 MMHG

## 2023-03-24 DIAGNOSIS — R16.0 HEPATOMEGALY, NOT ELSEWHERE CLASSIFIED: ICD-10-CM

## 2023-03-24 LAB
ANION GAP SERPL CALC-SCNC: 1 MMOL/L — LOW (ref 5–17)
BUN SERPL-MCNC: 17 MG/DL — SIGNIFICANT CHANGE UP (ref 7–23)
CALCIUM SERPL-MCNC: 8.3 MG/DL — LOW (ref 8.5–10.1)
CHLORIDE SERPL-SCNC: 112 MMOL/L — HIGH (ref 96–108)
CO2 SERPL-SCNC: 29 MMOL/L — SIGNIFICANT CHANGE UP (ref 22–31)
CREAT SERPL-MCNC: 0.67 MG/DL — SIGNIFICANT CHANGE UP (ref 0.5–1.3)
EGFR: 82 ML/MIN/1.73M2 — SIGNIFICANT CHANGE UP
GLUCOSE SERPL-MCNC: 97 MG/DL — SIGNIFICANT CHANGE UP (ref 70–99)
HCT VFR BLD CALC: 35.4 % — SIGNIFICANT CHANGE UP (ref 34.5–45)
HGB BLD-MCNC: 11.3 G/DL — LOW (ref 11.5–15.5)
MAGNESIUM SERPL-MCNC: 2.2 MG/DL — SIGNIFICANT CHANGE UP (ref 1.6–2.6)
MCHC RBC-ENTMCNC: 29.8 PG — SIGNIFICANT CHANGE UP (ref 27–34)
MCHC RBC-ENTMCNC: 31.9 GM/DL — LOW (ref 32–36)
MCV RBC AUTO: 93.4 FL — SIGNIFICANT CHANGE UP (ref 80–100)
PHOSPHATE SERPL-MCNC: 2.8 MG/DL — SIGNIFICANT CHANGE UP (ref 2.5–4.5)
PLATELET # BLD AUTO: 182 K/UL — SIGNIFICANT CHANGE UP (ref 150–400)
POTASSIUM SERPL-MCNC: 3.6 MMOL/L — SIGNIFICANT CHANGE UP (ref 3.5–5.3)
POTASSIUM SERPL-SCNC: 3.6 MMOL/L — SIGNIFICANT CHANGE UP (ref 3.5–5.3)
RBC # BLD: 3.79 M/UL — LOW (ref 3.8–5.2)
RBC # FLD: 14.6 % — HIGH (ref 10.3–14.5)
SODIUM SERPL-SCNC: 142 MMOL/L — SIGNIFICANT CHANGE UP (ref 135–145)
WBC # BLD: 6.41 K/UL — SIGNIFICANT CHANGE UP (ref 3.8–10.5)
WBC # FLD AUTO: 6.41 K/UL — SIGNIFICANT CHANGE UP (ref 3.8–10.5)

## 2023-03-24 PROCEDURE — 99232 SBSQ HOSP IP/OBS MODERATE 35: CPT

## 2023-03-24 PROCEDURE — 99239 HOSP IP/OBS DSCHRG MGMT >30: CPT

## 2023-03-24 PROCEDURE — 99221 1ST HOSP IP/OBS SF/LOW 40: CPT

## 2023-03-24 RX ORDER — CHOLECALCIFEROL (VITAMIN D3) 125 MCG
1 CAPSULE ORAL
Qty: 0 | Refills: 0 | DISCHARGE

## 2023-03-24 RX ADMIN — FAMOTIDINE 20 MILLIGRAM(S): 10 INJECTION INTRAVENOUS at 09:54

## 2023-03-24 RX ADMIN — CARBIDOPA AND LEVODOPA 1.5 TABLET(S): 25; 100 TABLET ORAL at 12:20

## 2023-03-24 RX ADMIN — Medication 1000 MILLIGRAM(S): at 08:20

## 2023-03-24 RX ADMIN — Medication 1 TABLET(S): at 09:54

## 2023-03-24 RX ADMIN — Medication 1 MILLIGRAM(S): at 09:54

## 2023-03-24 RX ADMIN — CARBIDOPA AND LEVODOPA 2 TABLET(S): 25; 100 TABLET ORAL at 07:49

## 2023-03-24 RX ADMIN — Medication 400 MILLIGRAM(S): at 07:50

## 2023-03-24 NOTE — DISCHARGE NOTE PROVIDER - PROVIDER TOKENS
PROVIDER:[TOKEN:[[id number]],FOLLOWUP:[2 weeks]],PROVIDER:[TOKEN:[[id number]],FOLLOWUP:[Routine]]

## 2023-03-24 NOTE — CONSULT NOTE ADULT - SUBJECTIVE AND OBJECTIVE BOX
Chief Complaint:  Patient is a 89y old  Female who presents with a chief complaint of needs lesion biopsy    HPI:  Patient is a 89 y/o female PMHx of vertigo, parkinson's [facility]  from [facility] s/p mechanical trip and fall from standing height onto face on concrete at 3:30pm today c/o face pain, L knee pain, b/l chest wall pain. She denies LOC. On imaging, multiple areas of brain, lung, and liver mets were noted with no known primary etiology. IR was consutled for biopsy. Pt seen at bedside, reported feeling well overall, denied any complaints.     Allergies  penicillin (Swelling)    MEDICATIONS  (STANDING):  carbidopa/levodopa  25/100 2 Tablet(s) Oral <User Schedule>  carbidopa/levodopa  25/100 1.5 Tablet(s) Oral <User Schedule>  famotidine    Tablet 20 milliGRAM(s) Oral every 12 hours  folic acid 1 milliGRAM(s) Oral daily  mirtazapine 7.5 milliGRAM(s) Oral at bedtime  multivitamin 1 Tablet(s) Oral daily  senna 2 Tablet(s) Oral at bedtime  sertraline 75 milliGRAM(s) Oral daily    MEDICATIONS  (PRN):  acetaminophen   IVPB .. 1000 milliGRAM(s) IV Intermittent every 6 hours PRN Severe Pain (7 - 10)  ondansetron Injectable 4 milliGRAM(s) IV Push every 6 hours PRN Nausea and/or Vomiting      PAST MEDICAL & SURGICAL HISTORY:  Vertigo  Parkinsons disease      Review of Systems:  As per HPI    Vital Signs Last 24 Hrs  T(C): 36.7 (24 Mar 2023 06:28), Max: 36.8 (23 Mar 2023 16:30)  T(F): 98.1 (24 Mar 2023 06:28), Max: 98.2 (23 Mar 2023 16:30)  HR: 80 (24 Mar 2023 12:06) (68 - 83)  BP: 139/99 (24 Mar 2023 12:06) (106/51 - 151/66)  BP(mean): 109 (24 Mar 2023 12:06) (52 - 109)  RR: 19 (24 Mar 2023 12:06) (15 - 27)  SpO2: 95% (24 Mar 2023 12:06) (94% - 96%)    Parameters below as of 24 Mar 2023 12:06  Patient On (Oxygen Delivery Method): room air        GENERAL APPEARANCE: Well developed, in no acute distress. Small abrasion to chin from fall.     LUNGS: Auscultation of the lungs revealed normal breath sounds without any other adventitious sounds or rubs.    CARDIOVASCULAR: There was a regular rate and rhythm    ABDOMEN: Soft and nontender with normal bowel sounds.     NEUROLOGIC: Alert and oriented x 3. Normal affect.     CBC                        11.3   6.41  )-----------( 182      ( 24 Mar 2023 05:46 )             35.4       Chemistry  03-24    142  |  112<H>  |  17  ----------------------------<  97  3.6   |  29  |  0.67    Ca    8.3<L>      24 Mar 2023 05:46  Phos  2.8     03-24  Mg     2.2     03-24      < from: CT Abdomen and Pelvis No Cont (03.21.23 @ 22:34) >  IMPRESSION:    No traumatic finding.    Numerous scattered bilateral pulmonary nodules suspicious for metastatic   disease. The largest is in the left lower lobe and measures 1.7 cm. Next   largest is in the right upper lobe and measures 1.1 cm.    There are several hepatic lesions which are indeterminant. Suspicious for   metastatic disease. Recommend further evaluation with ultrasound and/or   contrast-enhanced MRI.    < end of copied text >        
HPI:    89 y/o F PMHx of vertigo, parkinson's presented to the ED from home s/p mechanical trip and fall from standing height onto face on concrete 12 hours prior, now c/o face pain, L knee pain, b/l chest wall pain. She denied LOC, GCS 15 E:4 M:6 V:5. She stated she has a history of frequent falls due to her vertigo & parkinson's. She stated that she typically ambulates with walker but did not have it with her when she was outside.  (22 Mar 2023 02:34)      2023: Seen at bedside in SICU, no acute distress, awake, alert and oriented, had understanding of current medical situation including lung and liver lesions       PAST MEDICAL & SURGICAL HISTORY:    Vertigo  Parkinson's      MEDICATIONS  (STANDING):    carbidopa/levodopa  25/100 2 Tablet(s) Oral <User Schedule>  carbidopa/levodopa  25/100 1.5 Tablet(s) Oral <User Schedule>  famotidine    Tablet 20 milliGRAM(s) Oral every 12 hours  folic acid 1 milliGRAM(s) Oral daily  mirtazapine 7.5 milliGRAM(s) Oral at bedtime  multivitamin 1 Tablet(s) Oral daily  senna 2 Tablet(s) Oral at bedtime  sertraline 75 milliGRAM(s) Oral daily  sodium chloride 0.9%. 1000 milliLiter(s) (75 mL/Hr) IV Continuous <Continuous>      MEDICATIONS  (PRN):    acetaminophen   IVPB .. 1000 milliGRAM(s) IV Intermittent every 6 hours PRN Severe Pain (7 - 10)  ondansetron Injectable 4 milliGRAM(s) IV Push every 6 hours PRN Nausea and/or Vomiting      ALLERGIES:    penicillin (Swelling)      FAMILY HISTORY:    None noted      REVIEW OF SYSTEMS:    Constitutional, Eyes, ENT, Cardiovascular, Respiratory, Gastrointestinal, Genitourinary, Musculoskeletal, Integumentary, Neurological, Psychiatric, Endocrine, Heme/Lymph and Allergic/Immunologic review of systems are otherwise negative except as noted in HPI.       VITALS:    T(C): 36.6 (22 Mar 2023 08:37), Max: 37.1 (21 Mar 2023 20:32)  T(F): 97.8 (22 Mar 2023 08:37), Max: 98.8 (21 Mar 2023 20:32)  HR: 83 (22 Mar 2023 15:00) (76 - 91)  BP: 160/73 (22 Mar 2023 13:00) (115/89 - 180/76)  BP(mean): 95 (22 Mar 2023 13:00) (65 - 102)  RR: 19 (22 Mar 2023 15:00) (15 - 24)  SpO2: 95% (22 Mar 2023 15:00) (90% - 98%)    Parameters below as of 22 Mar 2023 06:36  Patient On (Oxygen Delivery Method): room air      PHYSICAL:    Constitutional: no acute distress  Eyes: PERRL, EOMI  ENT: pharynx is unremarkable; abrasion to nasal bridge under glasses  Neck: supple without JVD  Pulmonary: clear to auscultation bilaterally, no dullness, no wheezing  Cardiac: RRR, normal S1S2  Vascular: no calf tenderness, venous stasis changes, varices  Abdomen: normoactive bowel sounds, soft and nontender, no hepatosplenomegaly or masses appreciated  Lymphatic: no peripheral adenopathy appreciated  Musculoskeletal: full range of motion and no deformities appreciated; scattered areas of ecchymosis to chin and oral region   Skin: normal appearance, no rash/erythema  Neurology: grossly intact  Psychiatric: affect appropriate      LABS:    CBC Full  -  ( 22 Mar 2023 06:17 )  WBC Count : 9.77 K/uL  RBC Count : 4.12 M/uL  Hemoglobin : 12.2 g/dL  Hematocrit : 38.0 %  Platelet Count - Automated : 200 K/uL  Mean Cell Volume : 92.2 fl  Mean Cell Hemoglobin : 29.6 pg  Mean Cell Hemoglobin Concentration : 32.1 gm/dL  Auto Neutrophil # : x  Auto Lymphocyte # : x  Auto Monocyte # : x  Auto Eosinophil # : x  Auto Basophil # : x  Auto Neutrophil % : x  Auto Lymphocyte % : x  Auto Monocyte % : x  Auto Eosinophil % : x  Auto Basophil % : x    -    139  |  107  |  18  ----------------------------<  109<H>  4.0   |  30  |  0.84    Ca    9.0      22 Mar 2023 06:17  Phos  3.4     03-  Mg     2.3     -    TPro  7.8  /  Alb  3.2<L>  /  TBili  0.4  /  DBili  x   /  AST  21  /  ALT  9<L>  /  AlkPhos  61  03-    PT/INR - ( 21 Mar 2023 21:15 )   PT: 11.8 sec;   INR: 1.02 ratio         PTT - ( 21 Mar 2023 21:15 )  PTT:29.0 sec  Urinalysis Basic - ( 22 Mar 2023 00:13 )    Color: Yellow / Appearance: Clear / S.010 / pH: x  Gluc: x / Ketone: Negative  / Bili: Negative / Urobili: Negative   Blood: x / Protein: 15 / Nitrite: Negative   Leuk Esterase: Trace / RBC: 6-10 /HPF / WBC 0-2 /HPF   Sq Epi: x / Non Sq Epi: Occasional / Bacteria: Occasional        RADIOLOGY & ADDITIONAL STUDIES:      CT Abdomen and Pelvis No Cont (23 @ 22:34)    IMPRESSION:    No traumatic finding.    Numerous scattered bilateral pulmonary nodules suspicious for metastatic   disease. The largest is in the left lower lobe and measures 1.7 cm. Next   largest is in the right upper lobe and measures 1.1 cm.    There are several hepatic lesions which are indeterminant. Suspicious for   metastatic disease. Recommend further evaluation with ultrasound and/or   contrast-enhanced MRI.            CT Head No Cont (23 @ 04:03)    IMPRESSION:    Reidentified is a round 5 mm focus of hyperdensity within the anterior   stella, slightly less hyperattenuating than on prior exam, likely   reflecting a hemorrhage into a cavernoma versus bland hemorrhage.    Chronic changes as above.  
Patient is a 89y old  Female who presents with a chief complaint of pontine hyperdensity (22 Mar 2023 11:38)    BRIEF HOSPITAL COURSE: 89 yo female PMHx of vertigo, parkinson's presents to ED [facility]  from home s/p mechanical trip and fall from standing height onto face on concrete at 3:30pm today c/o face pain, L knee pain, b/l chest wall pain. She denies LOC.She states she has a history of frequent falls due to her vertigo. Typically ambulates with walker but did not have it with her when she was outside. Patient sitting up in stretcher, complains of left knee pain. She denies numbness/tingling, weakness, visual changes, dizziness    3/22 pt reports feeling okay. c/o some knee pain, but improved. denies HA, dizziness, chest pain, sob    PAST MEDICAL & SURGICAL HISTORY:    Medications:  acetaminophen   IVPB .. 1000 milliGRAM(s) IV Intermittent every 6 hours PRN  carbidopa/levodopa  25/100 2 Tablet(s) Oral <User Schedule>  carbidopa/levodopa  25/100 1.5 Tablet(s) Oral <User Schedule>  mirtazapine 7.5 milliGRAM(s) Oral at bedtime  ondansetron Injectable 4 milliGRAM(s) IV Push every 6 hours PRN  sertraline 75 milliGRAM(s) Oral daily  famotidine    Tablet 20 milliGRAM(s) Oral every 12 hours  senna 2 Tablet(s) Oral at bedtime  folic acid 1 miliGRAM(s) Oral daily  multivitamin 1 Tablet(s) Oral daily  sodium chloride 0.9%. 1000 milliLiter(s) IV Continuous <Continuous>      ICU Vital Signs Last 24 Hrs  T(C): 36.6 (22 Mar 2023 08:37), Max: 37.1 (21 Mar 2023 20:32)  T(F): 97.8 (22 Mar 2023 08:37), Max: 98.8 (21 Mar 2023 20:32)  HR: 85 (22 Mar 2023 12:12) (76 - 91)  BP: 115/89 (22 Mar 2023 12:12) (115/89 - 180/76)  BP(mean): 95 (22 Mar 2023 12:12) (65 - 102)  ABP: --  ABP(mean): --  RR: 15 (22 Mar 2023 12:12) (15 - 24)  SpO2: 95% (22 Mar 2023 12:12) (90% - 98%)    O2 Parameters below as of 22 Mar 2023 06:36  Patient On (Oxygen Delivery Method): room air      LABS:                        12.2   9.77  )-----------( 200      ( 22 Mar 2023 06:17 )             38.0     03-22    139  |  107  |  18  ----------------------------<  109<H>  4.0   |  30  |  0.84    Ca    9.0      22 Mar 2023 06:17  Phos  3.4     -  Mg     2.3     -    TPro  7.8  /  Alb  3.2<L>  /  TBili  0.4  /  DBili  x   /  AST  21  /  ALT  9<L>  /  AlkPhos  61  03-21      CAPILLARY BLOOD GLUCOSE  PT/INR - ( 21 Mar 2023 21:15 )   PT: 11.8 sec;   INR: 1.02 ratio    PTT - ( 21 Mar 2023 21:15 )  PTT:29.0 sec  Urinalysis Basic - ( 22 Mar 2023 00:13 )    Color: Yellow / Appearance: Clear / S.010 / pH: x  Gluc: x / Ketone: Negative  / Bili: Negative / Urobili: Negative   Blood: x / Protein: 15 / Nitrite: Negative   Leuk Esterase: Trace / RBC: 6-10 /HPF / WBC 0-2 /HPF   Sq Epi: x / Non Sq Epi: Occasional / Bacteria: Occasional      CULTURES:      Physical Examination:    General: No acute distress.   HEENT: Pupils equal, reactive to light.  Symmetric. laceration on chin repaired with sutures  PULM: Clear to auscultation bilaterally, no crackles or wheezing  NECK: Supple, no lymphadenopathy, trachea midline  CVS: Regular rate and rhythm, no murmurs  ABD: Soft, nondistended, nontender, normoactive bowel sounds  EXT: No LE edema, nontender. small abrasions on L knee, no bony tendernesss  SKIN: Warm and well perfused, no rashes noted.  NEURO: Alert, oriented, interactive    DEVICES:     RADIOLOGY:   < from: CT Head No Cont (23 @ 04:03) >  IMPRESSION:    Reidentified is a round 5 mm focus of hyperdensity within the anterior   stella, slightly less hyperattenuating than on prior exam, likely   reflecting a hemorrhage into a cavernoma versus bland hemorrhage.    Chronic changes as above.    < end of copied text >      < from: CT Head No Cont (23 @ 22:27) >  IMPRESSION:    CT HEAD:  Nonspecific 5 mm focus of increased density within the anterior stella   without significant adjacent edema or mass effect. This may represent a   small focal hemorrhage versus a cavernoma. Short-term follow-up CT can be   obtained. Alternatively, further evaluation with MRI of the brain can be   obtained.    Otherwise, there is no other evidence for intracranial bleed, mass effect   or depressed skull fracture.      CT FACIAL BONES: No acute fracture or acute dislocation.    Question collapse of the left posterior nasopharynx, however underlying   mass cannot be excluded. Recommend direct inspection.      CT CERVICAL SPINE: No acute fracture or acute subluxation.    < end of copied text >    < from: CT Abdomen and Pelvis No Cont (23 @ 22:34) >  IMPRESSION:    No traumatic finding.    Numerous scattered bilateral pulmonary nodules suspicious for metastatic   disease. The largest is in the left lower lobe and measures 1.7 cm. Next   largest is in the right upper lobe and measures 1.1 cm.    There are several hepatic lesions which are indeterminant. Suspicious for   metastatic disease. Recommend further evaluation with ultrasound and/or   contrast-enhanced MRI.    < end of copied text >

## 2023-03-24 NOTE — PROGRESS NOTE ADULT - NS ATTEND AMEND GEN_ALL_CORE FT
per A/P in progress note
CTH with 5mm hyperdensity in anterior stella hemorrhage vs cavernona  Rpt CTH stable  CTAP revealing multiple pulmonary nodules suspicious for metastasis, also liver lesions    Plan:  Metastatic W/U  Hen/ONC consulted  Venodynes  PT  PO Diet  Pain Control

## 2023-03-24 NOTE — DISCHARGE NOTE PROVIDER - CARE PROVIDERS DIRECT ADDRESSES
[email or web address]

## 2023-03-24 NOTE — DISCHARGE NOTE PROVIDER - NSDCMRMEDTOKEN_GEN_ALL_CORE_FT
carbidopa-levodopa 25 mg-100 mg oral tablet: 2 tab(s) orally 2 times a day  ***8am and 4pm***  carbidopa-levodopa 25 mg-100 mg oral tablet: 1.5 tab(s) orally 2 times a day  ***noon and 8pm***  fluticasone 50 mcg/inh nasal spray: 1 spray(s) in each nostril once a day, As Needed  Refresh ophthalmic solution: 1 drop(s) to each affected eye 4 times a day, As Needed - for dry eyes  Remeron 15 mg oral tablet: 0.5 tab(s) orally once a day (at bedtime)  Zoloft 25 mg oral tablet: 3 tab(s) orally once a day

## 2023-03-24 NOTE — DISCHARGE NOTE PROVIDER - NSDCCPCAREPLAN_GEN_ALL_CORE_FT
PRINCIPAL DISCHARGE DIAGNOSIS  Diagnosis: Cerebellar lesion  Assessment and Plan of Treatment: no neurosurgical intervention at this time  Follow up with oncology team after liver biopsy on 3/30  call the office if there are any questions or concerns      SECONDARY DISCHARGE DIAGNOSES  Diagnosis: Facial laceration  Assessment and Plan of Treatment: Sutures need to be removed on 3/29, can be removed by primary care physician or patient can return to the emergency room to have them removed    Diagnosis: Metastatic disease  Assessment and Plan of Treatment: Patient is scheduled to have a biopsy of liver lesion on 3/30 here at [facility]   nothing to eat or drink after midnight the night before the procedure  you will go home the same day   do not take any aspirin between now and the procedure    Diagnosis: Parkinson's disease  Assessment and Plan of Treatment: continue all home medications and follow up with primary care physician

## 2023-03-24 NOTE — DISCHARGE NOTE PROVIDER - CARE PROVIDER_API CALL
Ciera Devlin  HEMATOLOGY  [address], [place] [address]  Phone: [phone]  Fax: [phone]  Follow Up Time: 2 weeks    Aneesh Mack; PhD)  Neurosurgery  [address] [facility], [address] [place] [address]  Phone: [phone]  Fax: [phone]  Follow Up Time: Routine

## 2023-03-24 NOTE — DISCHARGE NOTE NURSING/CASE MANAGEMENT/SOCIAL WORK - NSDCPETBCESMAN_GEN_ALL_CORE
yes
If you are a smoker, it is important for your health to stop smoking. Please be aware that second hand smoke is also harmful.

## 2023-03-24 NOTE — CONSULT NOTE ADULT - CONSULT REASON
med  mgmt
Lung and Liver Lesions
89yo F with incidental findings of brain, lung, and liver lesions on imaging, referred to IR for biopsy

## 2023-03-24 NOTE — PROGRESS NOTE ADULT - ASSESSMENT
89 y/o F with PMHx of vertigo and Parkinson's found to have numerous lung nodules and hepatic lesion on CT A/P s/p mechanical trip and fall with head trauma    # Scattered Bilateral Pulmonary Nodules & Hepatic Lesions    - CT A/P imaging revealed: Numerous scattered bilateral pulmonary nodules suspicious for metastatic disease. The largest is in the left lower lobe and measures 1.7 cm. Next largest is in the right upper lobe and measures 1.1 cm. There are several hepatic lesions which are indeterminant. Suspicious for metastatic disease.  - MRI abdomen confirmed multiple bilobar liver metastases with focal L posterior iliac bone enhancement, cannot r/o bone mets  - MRI head revealed L cerebellar metastasis with R basal ganglia punctate lesion  - Patient expressed wishes to have bx done to determine dx and help guide potential treatment options  - Recommend IR consult for tissue bx from most appropriate location to help determine dx  - Continue supportive measures    89 y/o F with PMHx of vertigo and Parkinson's found to have numerous lung nodules and hepatic lesion on CT A/P s/p mechanical trip and fall with head trauma    # Scattered Bilateral Pulmonary Nodules & Hepatic Lesions    - CT A/P imaging revealed: Numerous scattered bilateral pulmonary nodules suspicious for metastatic disease. The largest is in the left lower lobe and measures 1.7 cm. Next largest is in the right upper lobe and measures 1.1 cm. There are several hepatic lesions which are indeterminant. Suspicious for metastatic disease.  - MRI abdomen confirmed multiple bilobar liver metastases with focal L posterior iliac bone enhancement, cannot r/o bone mets  - MRI head revealed L cerebellar metastasis with R basal ganglia punctate lesion  - Had lengthy conversation with patient, her daughter (Ashleigh) and son in law (Koby - Neurologist), expressed wishes to have bx done to determine dx and help guide potential treatment options  - Recommend IR consult for tissue bx from most appropriate location to help determine dx ---> likely outpatient based on patient's overall functional status  - Spoke with RN who noted she is being downgraded today  - Continue supportive measures    89 y/o F with PMHx of vertigo and Parkinson's found to have numerous lung nodules and hepatic lesion on CT A/P s/p mechanical trip and fall with head trauma    # Scattered Bilateral Pulmonary Nodules & Hepatic Lesions    - CT A/P imaging revealed: Numerous scattered bilateral pulmonary nodules suspicious for metastatic disease. The largest is in the left lower lobe and measures 1.7 cm. Next largest is in the right upper lobe and measures 1.1 cm. There are several hepatic lesions which are indeterminant. Suspicious for metastatic disease.  - MRI abdomen confirmed multiple bilobar liver metastases with focal L posterior iliac bone enhancement, cannot r/o bone mets  - MRI head revealed L cerebellar metastasis with R basal ganglia punctate lesion  - Had lengthy conversation with patient, her daughter (Ashleigh) and son in law (Koby - Neurologist), expressed wishes to have bx done to determine dx and help guide potential treatment options  - Recommend IR consult for tissue bx. Probably best option will be liver biopsy.  Patient wants to discuss with family before she decides if she wants to pursue w/up and next discuss treatment options.  If  decided to have biopsy in outpatient- will need follow up appointment in our office to review results/ plan.

## 2023-03-24 NOTE — DISCHARGE NOTE PROVIDER - NSDCACTIVITY_GEN_ALL_CORE
Do not drive or operate machinery/Showering allowed/Stairs allowed/No heavy lifting/straining/Walking - Outdoors allowed

## 2023-03-24 NOTE — PROVIDER CONTACT NOTE (OTHER) - SITUATION
Patient PCP is Dr. Low . Phone number is : [phone]. Fax is [phone]. Faxing the discharge papers. Minoxidil Counseling: Minoxidil is a topical medication which can increase blood flow where it is applied. It is uncertain how this medication increases hair growth. Side effects are uncommon and include stinging and allergic reactions.

## 2023-03-24 NOTE — DISCHARGE NOTE PROVIDER - DATE OF DISCHARGE SERVICE:
Pt resting comfortably. pt awake,alert X 4.pt cooperative. pt agreed to not to remove IV line. left arm soft restraint removed. pt has sitter at beside for safety. 24-Mar-2023

## 2023-03-24 NOTE — DISCHARGE NOTE NURSING/CASE MANAGEMENT/SOCIAL WORK - PATIENT PORTAL LINK FT
You can access the FollowMyHealth Patient Portal offered by [facility] by registering at the following website: [email or web address]. By joining [email or web address] FollowMyHealth portal, you will also be able to view your health information using other applications (apps) compatible with our system.

## 2023-03-24 NOTE — CONSULT NOTE ADULT - PROBLEM SELECTOR RECOMMENDATION 9
Case reviewed with Dr. Azar, plan for outpatient liver mass biopsy.   - Appointment for 3/20/23 @ 8AM here at , pt provided with appointment sheet  - Pt should be NPO after midnight starting day before  - Will draw AM labs in [facility]  - Pt not on any blood thinners at home

## 2023-03-24 NOTE — DISCHARGE NOTE PROVIDER - HOSPITAL COURSE
Patient is a 89 y/o female PMHx of vertigo, parkinson's [facility]  [facility] home s/p mechanical trip and fall from standing height onto face on concrete at 3:30pm today c/o face pain, L knee pain, b/l chest wall pain. She denies LOC. GCS 15 E:4 M:6 V:5. She states she has a history of frequent falls due to her vertigo. Typically ambulates with walker but did not have it with her when she was outside. Patient sitting up in stretcher, complains of left knee pain. She denies numbness/tingling, weakness, visual changes, dizziness.     3/22- pt admitted earlier today, repeat CT stable, +lesions in lung and liver- pt had originally refused MR but now agrees to MR with sedation, currently pt denies headache, nausea, or vomiting, tolerated breakfast, awaiting evaluation by physical therapy      3/23- Pt seen and examined in the SDU, no events overnight, pending MRI today, Heme/Onc assessment appreciated, pt was able to ambulate with PT, is tolerating PO diet and denies headache.     3/24- pt seen and examined, no events overnight, pt did have MR brain and abdomen yesterday which showed a left cerebellar metastasis as well as multiple bilobar liver lesions. IR was consulted and they are able to set up an appointment for an outpatient biopsy on Thursday 3/30 at 8am. Family agrees with taking patient home and following up as an outpatient. Vital signs are stable, pt is ambulating well with a walker, sutures on chin will need to be removed on 3/29- either by primary care physician or return to the ED to have them removed.     < from: MR Head w/wo IV Cont (03.23.23 @ 15:42) >  1. Left cerebellar metastasis. Punctate lesion right basal ganglia also likely metastasis  2. 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 Ischemic white matter disease and atrophy typical for age.    < from: MR Abdomen w/wo IV Cont (03.23.23 @ 15:46) >  IMPRESSION:  *  Multiple bilobar liver metastases are confirmed.  *  Indeterminate focus of enhancement in the left posterior iliac bone.   Cannot rule out metastatic disease to the bone.

## 2023-03-24 NOTE — DISCHARGE NOTE PROVIDER - NSDCFUADDINST_GEN_ALL_CORE_FT
Advance diet and activity as tolerated   Avoid heavy lifting   DO NOT TAKE ANY ASPIRIN between now and the biopsy -- Tylenol as needed for pain or headache   Will need follow up with Dr. May after to biopsy for results and to discuss plan of care  Follow up with Dr. Mack from neurosurgery only if there are any specific questions or concerns   If you develop severe headache, visual changes, frequent falls, difficulty with balance, chest pain or shortness of breath please return to [facility].

## 2023-03-24 NOTE — CONSULT NOTE ADULT - ASSESSMENT
89yo F with incidental findings of brain, lung, and liver lesions on imaging, referred to IR for biopsy  - Heme/onc and neurosurgery recommending biopsy to determine primary diagnosis

## 2023-03-24 NOTE — PROGRESS NOTE ADULT - SUBJECTIVE AND OBJECTIVE BOX
Patient is a 89y old  Female who presents with a chief complaint of pontine hyperdensity (22 Mar 2023 11:38)    BRIEF HOSPITAL COURSE: 89 yo female PMHx of vertigo, parkinson's presents to ED [facility]  from home s/p mechanical trip and fall from standing height onto face on concrete at 3:30pm today c/o face pain, L knee pain, b/l chest wall pain. She denies LOC.She states she has a history of frequent falls due to her vertigo. Typically ambulates with walker but did not have it with her when she was outside. Patient sitting up in stretcher, complains of left knee pain. She denies numbness/tingling, weakness, visual changes, dizziness    3/22 pt reports feeling okay. c/o some knee pain, but improved. denies HA, dizziness, chest pain, sob  3/23 no events overnight    PAST MEDICAL & SURGICAL HISTORY:    Medications:  acetaminophen   IVPB .. 1000 milliGRAM(s) IV Intermittent every 6 hours PRN  carbidopa/levodopa  25/100 2 Tablet(s) Oral <User Schedule>  carbidopa/levodopa  25/100 1.5 Tablet(s) Oral <User Schedule>  mirtazapine 7.5 milliGRAM(s) Oral at bedtime  ondansetron Injectable 4 milliGRAM(s) IV Push every 6 hours PRN  sertraline 75 milliGRAM(s) Oral daily  famotidine    Tablet 20 milliGRAM(s) Oral every 12 hours  senna 2 Tablet(s) Oral at bedtime  folic acid 1 miliGRAM(s) Oral daily  multivitamin 1 Tablet(s) Oral daily  sodium chloride 0.9%. 1000 milliLiter(s) IV Continuous <Continuous>      Vital Signs Last 24 Hrs  T(C): 36.3 (23 Mar 2023 08:02), Max: 36.7 (22 Mar 2023 21:01)  T(F): 97.4 (23 Mar 2023 08:02), Max: 98 (22 Mar 2023 21:01)  HR: 82 (23 Mar 2023 10:00) (64 - 93)  BP: 129/56 (23 Mar 2023 10:00) (92/73 - 160/73)  BP(mean): 71 (23 Mar 2023 10:00) (58 - 102)  RR: 22 (23 Mar 2023 10:00) (14 - 25)  SpO2: 94% (23 Mar 2023 09:00) (91% - 97%)    Parameters below as of 23 Mar 2023 08:00  Patient On (Oxygen Delivery Method): room air      LABS:                        11.7   7.70  )-----------( 180      ( 23 Mar 2023 06:42 )             36.7     03-23    143  |  113<H>  |  15  ----------------------------<  89  3.7   |  25  |  0.64    Ca    8.5      23 Mar 2023 06:42  Phos  2.8     03-23  Mg     2.2     03-23    TPro  7.8  /  Alb  3.2<L>  /  TBili  0.4  /  DBili  x   /  AST  21  /  ALT  9<L>  /  AlkPhos  61  03-21    LIVER FUNCTIONS - ( 21 Mar 2023 21:15 )  Alb: 3.2 g/dL / Pro: 7.8 gm/dL / ALK PHOS: 61 U/L / ALT: 9 U/L / AST: 21 U/L / GGT: x           PT/INR - ( 21 Mar 2023 21:15 )   PT: 11.8 sec;   INR: 1.02 ratio         PTT - ( 21 Mar 2023 21:15 )  PTT:29.0 sec  Urinalysis Basic - ( 22 Mar 2023 00:13 )    Color: Yellow / Appearance: Clear / S.010 / pH: x  Gluc: x / Ketone: Negative  / Bili: Negative / Urobili: Negative   Blood: x / Protein: 15 / Nitrite: Negative   Leuk Esterase: Trace / RBC: 6-10 /HPF / WBC 0-2 /HPF   Sq Epi: x / Non Sq Epi: Occasional / Bacteria: Occasional    CULTURES:      Physical Examination:    General: No acute distress.   HEENT: Pupils equal, reactive to light.  Symmetric. laceration on chin repaired with sutures  PULM: Clear to auscultation bilaterally, no crackles or wheezing  NECK: Supple, no lymphadenopathy, trachea midline  CVS: Regular rate and rhythm, no murmurs  ABD: Soft, nondistended, nontender, normoactive bowel sounds  EXT: No LE edema, nontender. small abrasions on L knee, no bony tendernesss  SKIN: Warm and well perfused, no rashes noted.  NEURO: Alert, oriented, interactive    DEVICES:     RADIOLOGY:   < from: CT Head No Cont (23 @ 04:03) >  IMPRESSION:    Reidentified is a round 5 mm focus of hyperdensity within the anterior   stella, slightly less hyperattenuating than on prior exam, likely   reflecting a hemorrhage into a cavernoma versus bland hemorrhage.    Chronic changes as above.    < end of copied text >      < from: CT Head No Cont (23 @ 22:27) >  IMPRESSION:    CT HEAD:  Nonspecific 5 mm focus of increased density within the anterior stella   without significant adjacent edema or mass effect. This may represent a   small focal hemorrhage versus a cavernoma. Short-term follow-up CT can be   obtained. Alternatively, further evaluation with MRI of the brain can be   obtained.    Otherwise, there is no other evidence for intracranial bleed, mass effect   or depressed skull fracture.      CT FACIAL BONES: No acute fracture or acute dislocation.    Question collapse of the left posterior nasopharynx, however underlying   mass cannot be excluded. Recommend direct inspection.      CT CERVICAL SPINE: No acute fracture or acute subluxation.    < end of copied text >    < from: CT Abdomen and Pelvis No Cont (23 @ 22:34) >  IMPRESSION:    No traumatic finding.    Numerous scattered bilateral pulmonary nodules suspicious for metastatic   disease. The largest is in the left lower lobe and measures 1.7 cm. Next   largest is in the right upper lobe and measures 1.1 cm.    There are several hepatic lesions which are indeterminant. Suspicious for   metastatic disease. Recommend further evaluation with ultrasound and/or   contrast-enhanced MRI.    < end of copied text >          
HPI:    89 y/o F PMHx of vertigo, parkinson's presented to the ED from home s/p mechanical trip and fall from standing height onto face on concrete 12 hours prior, now c/o face pain, L knee pain, b/l chest wall pain. She denied LOC, GCS 15 E:4 M:6 V:5. She stated she has a history of frequent falls due to her vertigo & parkinson's. She stated that she typically ambulates with walker but did not have it with her when she was outside.  (22 Mar 2023 02:34)      03/22/2023: Seen at bedside in SICU, no acute distress, awake, alert and oriented, had understanding of current medical situation including lung and liver lesions     03/24/2023: Seen at bedside, eating breakfast, no acute distress, expressing wishes to have bx for diagnosis to determine potential treatment options      PAST MEDICAL & SURGICAL HISTORY:    Vertigo  Parkinson's      MEDICATIONS  (STANDING):    carbidopa/levodopa  25/100 2 Tablet(s) Oral <User Schedule>  carbidopa/levodopa  25/100 1.5 Tablet(s) Oral <User Schedule>  famotidine    Tablet 20 milliGRAM(s) Oral every 12 hours  folic acid 1 milliGRAM(s) Oral daily  mirtazapine 7.5 milliGRAM(s) Oral at bedtime  multivitamin 1 Tablet(s) Oral daily  senna 2 Tablet(s) Oral at bedtime  sertraline 75 milliGRAM(s) Oral daily  sodium chloride 0.9%. 1000 milliLiter(s) (75 mL/Hr) IV Continuous <Continuous>      MEDICATIONS  (PRN):    acetaminophen   IVPB .. 1000 milliGRAM(s) IV Intermittent every 6 hours PRN Severe Pain (7 - 10)  ondansetron Injectable 4 milliGRAM(s) IV Push every 6 hours PRN Nausea and/or Vomiting      ALLERGIES:    penicillin (Swelling)      FAMILY HISTORY:    None noted      REVIEW OF SYSTEMS:    Constitutional, Eyes, ENT, Cardiovascular, Respiratory, Gastrointestinal, Genitourinary, Musculoskeletal, Integumentary, Neurological, Psychiatric, Endocrine, Heme/Lymph and Allergic/Immunologic review of systems are otherwise negative except as noted in HPI.       VITALS:    ICU Vital Signs Last 24 Hrs  T(C): 36.7 (24 Mar 2023 06:28), Max: 36.8 (23 Mar 2023 16:30)  T(F): 98.1 (24 Mar 2023 06:28), Max: 98.2 (23 Mar 2023 16:30)  HR: 76 (24 Mar 2023 08:00) (68 - 92)  BP: 151/66 (24 Mar 2023 08:00) (106/51 - 151/66)  BP(mean): 89 (24 Mar 2023 08:00) (57 - 96)  ABP: --  ABP(mean): --  RR: 17 (24 Mar 2023 08:00) (15 - 27)  SpO2: 96% (24 Mar 2023 08:00) (94% - 96%)    O2 Parameters below as of 24 Mar 2023 08:00  Patient On (Oxygen Delivery Method): room air      PHYSICAL:    Constitutional: no acute distress  Eyes: PERRL, EOMI  ENT: pharynx is unremarkable; abrasion to nasal bridge under glasses  Neck: supple without JVD  Pulmonary: clear to auscultation bilaterally, no dullness, no wheezing  Cardiac: RRR, normal S1S2  Vascular: no calf tenderness, venous stasis changes, varices  Abdomen: normoactive bowel sounds, soft and nontender, no hepatosplenomegaly or masses appreciated  Lymphatic: no peripheral adenopathy appreciated  Musculoskeletal: full range of motion and no deformities appreciated; scattered areas of ecchymosis to chin and oral region   Skin: normal appearance, no rash/erythema  Neurology: grossly intact  Psychiatric: affect appropriate      LABS:                          11.3   6.41  )-----------( 182      ( 24 Mar 2023 05:46 )             35.4     03-24    142  |  112<H>  |  17  ----------------------------<  97  3.6   |  29  |  0.67    Ca    8.3<L>      24 Mar 2023 05:46  Phos  2.8     03-24  Mg     2.2     03-24        RADIOLOGY & ADDITIONAL STUDIES:      CT Abdomen and Pelvis No Cont (03.21.23 @ 22:34)    IMPRESSION:    No traumatic finding.    Numerous scattered bilateral pulmonary nodules suspicious for metastatic   disease. The largest is in the left lower lobe and measures 1.7 cm. Next   largest is in the right upper lobe and measures 1.1 cm.    There are several hepatic lesions which are indeterminant. Suspicious for   metastatic disease. Recommend further evaluation with ultrasound and/or   contrast-enhanced MRI.          CT Head No Cont (03.22.23 @ 04:03)    IMPRESSION:    Reidentified is a round 5 mm focus of hyperdensity within the anterior   stella, slightly less hyperattenuating than on prior exam, likely   reflecting a hemorrhage into a cavernoma versus bland hemorrhage.    Chronic changes as above.          MR Head w/wo IV Cont (03.23.23 @ 15:42)    IMPRESSION:    1. Left cerebellar metastasis. Punctate lesion right basal ganglia also   likely metastasis    2. 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 Ischemic white matter disease and atrophy typical for age.          MR Abdomen w/wo IV Cont (03.23.23 @ 15:46)    IMPRESSION:  *  Multiple bilobar liver metastases are confirmed.  *  Indeterminate focus of enhancement in the left posterior iliac bone.   Cannot rule out metastatic disease to the bone.    
HPI: Patient is a 89 y/o female PMHx of vertigo, parkinson's [facility]  [facility] s/p mechanical trip and fall from standing height onto face on concrete at 3:30pm today c/o face pain, L knee pain, b/l chest wall pain. She denies LOC. GCS 15 E:4 M:6 V:5. She states she has a history of frequent falls due to her vertigo. Typically ambulates with walker but did not have it with her when she was outside. Patient sitting up in stretcher, complains of left knee pain. She denies numbness/tingling, weakness, visual changes, dizziness.     3/22- pt admitted earlier today, repeat CT stable, +lesions in lung and liver- pt had originally refused MR but now agrees to MR with sedation, currently pt denies headache, nausea, or vomiting, tolerated breakfast, awaiting evaluation by physical therapy      Vital Signs Last 24 Hrs  T(C): 36.6 (22 Mar 2023 08:37), Max: 37.1 (21 Mar 2023 20:32)  T(F): 97.8 (22 Mar 2023 08:37), Max: 98.8 (21 Mar 2023 20:32)  HR: 91 (22 Mar 2023 10:00) (76 - 91)  BP: 118/58 (22 Mar 2023 10:00) (118/58 - 180/76)  BP(mean): 65 (22 Mar 2023 10:00) (65 - 96)  RR: 24 (22 Mar 2023 10:00) (16 - 24)  SpO2: 90% (22 Mar 2023 10:00) (90% - 98%)    Parameters below as of 22 Mar 2023 06:36  Patient On (Oxygen Delivery Method): room air    MEDICATIONS  (STANDING):  carbidopa/levodopa  25/100 2 Tablet(s) Oral <User Schedule>  carbidopa/levodopa  25/100 1.5 Tablet(s) Oral <User Schedule>  famotidine    Tablet 20 milliGRAM(s) Oral every 12 hours  folic acid 1 milliGRAM(s) Oral daily  mirtazapine 7.5 milliGRAM(s) Oral at bedtime  multivitamin 1 Tablet(s) Oral daily  senna 2 Tablet(s) Oral at bedtime  sertraline 75 milliGRAM(s) Oral daily  sodium chloride 0.9%. 1000 milliLiter(s) (75 mL/Hr) IV Continuous <Continuous>    MEDICATIONS  (PRN):  acetaminophen   IVPB .. 1000 milliGRAM(s) IV Intermittent every 6 hours PRN Severe Pain (7 - 10)  ondansetron Injectable 4 milliGRAM(s) IV Push every 6 hours PRN Nausea and/or Vomiting    ROS: pertinent positives in HPI, all other ROS were reviewed and are negative     PHYSICAL EXAM:  Constitutional: awake and alert, resting comfortably in bed   Eyes: anicteric sclerae, moist conjunctivae, PERRLA  HENT: + lac chin sutured in ER c/d/i, dried blood to upper lip and nares, oropharynx clear dry mucous membranes  Neck: Supple  Respiratory: Breath sounds are clear bilaterally  Cardiovascular: S1 and S2, regular  rhythm  Gastrointestinal: soft, nontender  Extremities:  no edema  Musculoskeletal: + TTP L knee with ROM no erythema small abrasion noted without bleeding  Skin: No rashes  Psych: Appropriately interactive, normal affect    Neurological exam:  HF: A x O x 3. Appropriately interactive, normal affect. Speech fluent, No Aphasia or paraphasic errors. Naming /repetition intact   CN: PEARRL, EOMI, VFF, facial sensation normal, no NLFD, tongue midline  Motor: No pronator drift, Strength 5/5 except bl LE lifts antigravity off stretcher  Sens: Intact to light touch  Reflexes: Symmetric and normal, downgoing toes b/l  Coord:  No FNFA, dysmetria, KEITH intact   Gait/Balance: not tested       LABS:                         12.2   9.77  )-----------( 200      ( 22 Mar 2023 06:17 )             38.0     03-22    139  |  107  |  18  ----------------------------<  109<H>  4.0   |  30  |  0.84    Ca    9.0      22 Mar 2023 06:17  Phos  3.4     03-22  Mg     2.3     03-22    TPro  7.8  /  Alb  3.2<L>  /  TBili  0.4  /  DBili  x   /  AST  21  /  ALT  9<L>  /  AlkPhos  61  03-21    LIVER FUNCTIONS - ( 21 Mar 2023 21:15 )  Alb: 3.2 g/dL / Pro: 7.8 gm/dL / ALK PHOS: 61 U/L / ALT: 9 U/L / AST: 21 U/L / GGT: x             RADIOLOGY:  < from: CT Head No Cont (03.22.23 @ 04:03) >  Reidentified is a round 5 mm focus of hyperdensity within the anterior   stella, slightly less hyperattenuating than on prior exam, likely   reflecting a hemorrhage into a cavernoma versus bland hemorrhage.    Chronic changes as above.    < from: CT Abdomen and Pelvis No Cont (03.21.23 @ 22:34) >  No traumatic finding.    Numerous scattered bilateral pulmonary nodules suspicious for metastatic   disease. The largest is in the left lower lobe and measures 1.7 cm. Next   largest is in the right upper lobe and measures 1.1 cm.    There are several hepatic lesions which are indeterminant. Suspicious for   metastatic disease. Recommend further evaluation with ultrasound and/or   contrast-enhanced MRI.      
HPI: Patient is a 89 y/o female PMHx of vertigo, parkinson's [facility]  [facility] home s/p mechanical trip and fall from standing height onto face on concrete at 3:30pm today c/o face pain, L knee pain, b/l chest wall pain. She denies LOC. GCS 15 E:4 M:6 V:5. She states she has a history of frequent falls due to her vertigo. Typically ambulates with walker but did not have it with her when she was outside. Patient sitting up in stretcher, complains of left knee pain. She denies numbness/tingling, weakness, visual changes, dizziness.     3/22- pt admitted earlier today, repeat CT stable, +lesions in lung and liver- pt had originally refused MR but now agrees to MR with sedation, currently pt denies headache, nausea, or vomiting, tolerated breakfast, awaiting evaluation by physical therapy      3/23- Pt seen and examined in the SDU, no events overnight, pending MRI today, Heme/Onc assessment appreciated, pt was able to ambulate with PT, is tolerating PO diet and denies headache.     Vital Signs Last 24 Hrs  T(C): 36.3 (23 Mar 2023 08:02), Max: 36.7 (22 Mar 2023 21:01)  T(F): 97.4 (23 Mar 2023 08:02), Max: 98 (22 Mar 2023 21:01)  HR: 87 (23 Mar 2023 09:00) (64 - 93)  BP: 146/82 (23 Mar 2023 09:00) (92/73 - 160/73)  BP(mean): 96 (23 Mar 2023 09:00) (58 - 102)  RR: 22 (23 Mar 2023 09:00) (14 - 25)  SpO2: 94% (23 Mar 2023 09:00) (90% - 97%)    Parameters below as of 23 Mar 2023 08:00  Patient On (Oxygen Delivery Method): room air    MEDICATIONS  (STANDING):  carbidopa/levodopa  25/100 2 Tablet(s) Oral <User Schedule>  carbidopa/levodopa  25/100 1.5 Tablet(s) Oral <User Schedule>  famotidine    Tablet 20 milliGRAM(s) Oral every 12 hours  folic acid 1 milliGRAM(s) Oral daily  mirtazapine 7.5 milliGRAM(s) Oral at bedtime  multivitamin 1 Tablet(s) Oral daily  senna 2 Tablet(s) Oral at bedtime  sertraline 75 milliGRAM(s) Oral daily    MEDICATIONS  (PRN):  acetaminophen   IVPB .. 1000 milliGRAM(s) IV Intermittent every 6 hours PRN Severe Pain (7 - 10)  ondansetron Injectable 4 milliGRAM(s) IV Push every 6 hours PRN Nausea and/or Vomiting    ROS: pertinent positives in HPI, all other ROS were reviewed and are negative     PHYSICAL EXAM:  Constitutional: awake and alert, resting comfortably in bed   Eyes: anicteric sclerae, moist conjunctivae, PERRLA  HENT: + lac chin sutured in ER c/d/i, +ecchymosis, moist mucus membranes  Neck: Supple  Respiratory: Breath sounds are clear bilaterally  Cardiovascular: S1 and S2, regular  rhythm  Gastrointestinal: soft, nontender  Extremities:  no edema  Musculoskeletal: +left knee ecchymosis, decreased ROM due to pain   Skin: No rashes  Psych: Appropriately interactive, normal affect    Neurological exam:  HF: A x O x 3. Appropriately interactive, normal affect. Speech fluent, No Aphasia or paraphasic errors. Naming /repetition intact   CN: PEARRL, EOMI, VFF, facial sensation normal, no NLFD, tongue midline  Motor: No pronator drift, moving all extremities antigravity with good strength  Sens: Intact to light touch  Reflexes: Symmetric and normal, downgoing toes b/l  Coord:  No FNFA, dysmetria, KEITH intact   Gait/Balance: not tested       LABS:                         11.7   7.70  )-----------( 180      ( 23 Mar 2023 06:42 )             36.7     03-23    143  |  113<H>  |  15  ----------------------------<  89  3.7   |  25  |  0.64    Ca    8.5      23 Mar 2023 06:42  Phos  2.8     03-23  Mg     2.2     03-23    TPro  7.8  /  Alb  3.2<L>  /  TBili  0.4  /  DBili  x   /  AST  21  /  ALT  9<L>  /  AlkPhos  61  03-21    LIVER FUNCTIONS - ( 21 Mar 2023 21:15 )  Alb: 3.2 g/dL / Pro: 7.8 gm/dL / ALK PHOS: 61 U/L / ALT: 9 U/L / AST: 21 U/L / GGT: x          RADIOLOGY:  PENDING MRI TODAY     < from: TTE Echo Complete w/o Contrast w/ Doppler (03.22.23 @ 13:19) >   The left ventricle is normal in size, wall thickness, wall motion and   contractility.   Estimated left ventricular ejection fraction is 55-60 %.   Normal appearing left atrium.   Normal appearing right atrium.   Normal appearing right ventricle structure and function.   Mild aortic sclerosis is present with minimally restricted valvular   opening.   The mitral valve leaflets appear thickened.   Mild to Moderate Tricuspid regurgitation is present.

## 2023-03-27 PROBLEM — R42 DIZZINESS AND GIDDINESS: Chronic | Status: ACTIVE | Noted: 2023-03-22

## 2023-03-27 PROBLEM — G20 PARKINSON'S DISEASE: Chronic | Status: ACTIVE | Noted: 2023-03-22

## 2023-03-29 DIAGNOSIS — D18.02 HEMANGIOMA OF INTRACRANIAL STRUCTURES: ICD-10-CM

## 2023-03-29 DIAGNOSIS — Z20.822 CONTACT WITH AND (SUSPECTED) EXPOSURE TO COVID-19: ICD-10-CM
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
## 2023-03-29 DIAGNOSIS — C34.90 MALIGNANT NEOPLASM OF UNSPECIFIED PART OF UNSPECIFIED BRONCHUS OR LUNG: ICD-10-CM

## 2023-03-29 DIAGNOSIS — Z88.0 ALLERGY STATUS TO PENICILLIN: ICD-10-CM

## 2023-03-29 DIAGNOSIS — S80.212A ABRASION, LEFT KNEE, INITIAL ENCOUNTER: ICD-10-CM

## 2023-03-29 DIAGNOSIS — G20 PARKINSON'S DISEASE: ICD-10-CM

## 2023-03-29 DIAGNOSIS — C71.6 MALIGNANT NEOPLASM OF CEREBELLUM: ICD-10-CM

## 2023-03-29 DIAGNOSIS — S00.81XA ABRASION OF OTHER PART OF HEAD, INITIAL ENCOUNTER: ICD-10-CM

## 2023-03-29 DIAGNOSIS — Z87.891 PERSONAL HISTORY OF NICOTINE DEPENDENCE: ICD-10-CM

## 2023-03-29 DIAGNOSIS — C22.9 MALIGNANT NEOPLASM OF LIVER, NOT SPECIFIED AS PRIMARY OR SECONDARY: ICD-10-CM

## 2023-03-29 DIAGNOSIS — S20.91XA ABRASION OF UNSPECIFIED PARTS OF THORAX, INITIAL ENCOUNTER: ICD-10-CM
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
## 2023-03-30 ENCOUNTER — OUTPATIENT (OUTPATIENT)
Dept: INPATIENT UNIT | Facility: HOSPITAL | Age: 88
LOS: 1 days | Discharge: HOME | End: 2023-03-30
Payer: MEDICARE

## 2023-03-30 ENCOUNTER — TRANSCRIPTION ENCOUNTER (OUTPATIENT)
Age: 88
End: 2023-03-30

## 2023-03-30 ENCOUNTER — RESULT REVIEW (OUTPATIENT)
Age: 88
End: 2023-03-30

## 2023-03-30 VITALS
WEIGHT: 125 LBS | RESPIRATION RATE: 18 BRPM | DIASTOLIC BLOOD PRESSURE: 85 MMHG | TEMPERATURE: 98 F | SYSTOLIC BLOOD PRESSURE: 175 MMHG | OXYGEN SATURATION: 97 % | HEIGHT: 64 IN | HEART RATE: 89 BPM

## 2023-03-30 VITALS
HEART RATE: 94 BPM | OXYGEN SATURATION: 98 % | DIASTOLIC BLOOD PRESSURE: 70 MMHG | TEMPERATURE: 97 F | SYSTOLIC BLOOD PRESSURE: 142 MMHG | RESPIRATION RATE: 16 BRPM

## 2023-03-30 DIAGNOSIS — Z96.641 PRESENCE OF RIGHT ARTIFICIAL HIP JOINT: Chronic | ICD-10-CM

## 2023-03-30 DIAGNOSIS — R16.0 HEPATOMEGALY, NOT ELSEWHERE CLASSIFIED: ICD-10-CM

## 2023-03-30 DIAGNOSIS — Z90.710 ACQUIRED ABSENCE OF BOTH CERVIX AND UTERUS: Chronic | ICD-10-CM

## 2023-03-30 PROCEDURE — 88172 CYTP DX EVAL FNA 1ST EA SITE: CPT

## 2023-03-30 PROCEDURE — 88342 IMHCHEM/IMCYTCHM 1ST ANTB: CPT

## 2023-03-30 PROCEDURE — 88341 IMHCHEM/IMCYTCHM EA ADD ANTB: CPT | Mod: 26

## 2023-03-30 PROCEDURE — 76942 ECHO GUIDE FOR BIOPSY: CPT | Mod: 26

## 2023-03-30 PROCEDURE — 88341 IMHCHEM/IMCYTCHM EA ADD ANTB: CPT

## 2023-03-30 PROCEDURE — 88307 TISSUE EXAM BY PATHOLOGIST: CPT

## 2023-03-30 PROCEDURE — 47000 NEEDLE BIOPSY OF LIVER PERQ: CPT

## 2023-03-30 PROCEDURE — 88307 TISSUE EXAM BY PATHOLOGIST: CPT | Mod: 26

## 2023-03-30 PROCEDURE — 88342 IMHCHEM/IMCYTCHM 1ST ANTB: CPT | Mod: 26

## 2023-03-30 PROCEDURE — 76942 ECHO GUIDE FOR BIOPSY: CPT

## 2023-03-30 RX ORDER — CARBIDOPA AND LEVODOPA 25; 100 MG/1; MG/1
1.5 TABLET ORAL
Qty: 0 | Refills: 0 | DISCHARGE

## 2023-03-30 RX ORDER — MIRTAZAPINE 45 MG/1
0.5 TABLET, ORALLY DISINTEGRATING ORAL
Qty: 0 | Refills: 0 | DISCHARGE

## 2023-03-30 RX ORDER — CARBIDOPA AND LEVODOPA 25; 100 MG/1; MG/1
2 TABLET ORAL
Qty: 0 | Refills: 0 | DISCHARGE

## 2023-03-30 RX ORDER — OXYCODONE HYDROCHLORIDE 5 MG/1
5 TABLET ORAL ONCE
Refills: 0 | Status: DISCONTINUED | OUTPATIENT
Start: 2023-03-30 | End: 2023-03-30

## 2023-03-30 RX ORDER — FENTANYL CITRATE 50 UG/ML
25 INJECTION INTRAVENOUS
Refills: 0 | Status: DISCONTINUED | OUTPATIENT
Start: 2023-03-30 | End: 2023-03-30

## 2023-03-30 RX ORDER — ONDANSETRON 8 MG/1
4 TABLET, FILM COATED ORAL ONCE
Refills: 0 | Status: DISCONTINUED | OUTPATIENT
Start: 2023-03-30 | End: 2023-03-30

## 2023-03-30 RX ORDER — SERTRALINE 25 MG/1
3 TABLET, FILM COATED ORAL
Qty: 0 | Refills: 0 | DISCHARGE

## 2023-03-30 RX ORDER — FLUTICASONE PROPIONATE 50 MCG
1 SPRAY, SUSPENSION NASAL
Qty: 0 | Refills: 0 | DISCHARGE

## 2023-03-30 RX ORDER — SODIUM CHLORIDE 9 MG/ML
1000 INJECTION, SOLUTION INTRAVENOUS
Refills: 0 | Status: DISCONTINUED | OUTPATIENT
Start: 2023-03-30 | End: 2023-03-30

## 2023-04-02 DIAGNOSIS — M25.551 PAIN IN RIGHT HIP: ICD-10-CM

## 2023-04-02 DIAGNOSIS — G20 PARKINSON'S DISEASE: ICD-10-CM

## 2023-04-02 DIAGNOSIS — Z47.1 AFTERCARE FOLLOWING JOINT REPLACEMENT SURGERY: ICD-10-CM

## 2023-04-02 DIAGNOSIS — Z87.39 PERSONAL HISTORY OF OTHER DISEASES OF THE MUSCULOSKELETAL SYSTEM AND CONNECTIVE TISSUE: ICD-10-CM

## 2023-04-02 DIAGNOSIS — R42 DIZZINESS AND GIDDINESS: ICD-10-CM

## 2023-04-02 DIAGNOSIS — Z99.89 DEPENDENCE ON OTHER ENABLING MACHINES AND DEVICES: ICD-10-CM

## 2023-04-02 DIAGNOSIS — R29.6 REPEATED FALLS: ICD-10-CM

## 2023-04-04 LAB — SURGICAL PATHOLOGY STUDY: SIGNIFICANT CHANGE UP

## 2023-04-06 DIAGNOSIS — R16.0 HEPATOMEGALY, NOT ELSEWHERE CLASSIFIED: ICD-10-CM

## 2023-04-07 PROBLEM — R29.6 FREQUENT FALLS: Status: ACTIVE | Noted: 2023-04-07

## 2023-04-07 PROBLEM — G20 PARKINSON'S DISEASE: Status: ACTIVE | Noted: 2023-04-07

## 2023-04-07 PROBLEM — Z99.89 USES WALKER: Status: ACTIVE | Noted: 2023-04-07

## 2023-04-07 PROBLEM — R42 VERTIGO: Status: ACTIVE | Noted: 2023-04-07

## 2023-04-10 ENCOUNTER — OUTPATIENT (OUTPATIENT)
Dept: OUTPATIENT SERVICES | Facility: HOSPITAL | Age: 88
LOS: 1 days | Discharge: HOME | End: 2023-04-10

## 2023-04-10 DIAGNOSIS — Z96.641 PRESENCE OF RIGHT ARTIFICIAL HIP JOINT: Chronic | ICD-10-CM

## 2023-04-10 DIAGNOSIS — C18.9 MALIGNANT NEOPLASM OF COLON, UNSPECIFIED: ICD-10-CM

## 2023-04-10 DIAGNOSIS — Z90.710 ACQUIRED ABSENCE OF BOTH CERVIX AND UTERUS: Chronic | ICD-10-CM

## 2023-04-11 ENCOUNTER — APPOINTMENT (OUTPATIENT)
Dept: HEMATOLOGY ONCOLOGY | Facility: CLINIC | Age: 88
End: 2023-04-11
Payer: MEDICARE

## 2023-04-11 VITALS
OXYGEN SATURATION: 95 % | RESPIRATION RATE: 18 BRPM | TEMPERATURE: 98.2 F | HEART RATE: 88 BPM | DIASTOLIC BLOOD PRESSURE: 57 MMHG | SYSTOLIC BLOOD PRESSURE: 112 MMHG

## 2023-04-11 DIAGNOSIS — Z87.891 PERSONAL HISTORY OF NICOTINE DEPENDENCE: ICD-10-CM

## 2023-04-11 PROCEDURE — 99215 OFFICE O/P EST HI 40 MIN: CPT

## 2023-04-11 RX ORDER — MIRTAZAPINE 30 MG/1
TABLET, FILM COATED ORAL
Refills: 0 | Status: ACTIVE | COMMUNITY

## 2023-04-11 RX ORDER — LUBIPROSTONE 8 UG/1
8 CAPSULE, GELATIN COATED ORAL TWICE DAILY
Qty: 60 | Refills: 3 | Status: DISCONTINUED | COMMUNITY
Start: 2022-02-09 | End: 2023-04-11

## 2023-04-11 RX ORDER — SERTRALINE HYDROCHLORIDE 25 MG/1
TABLET, FILM COATED ORAL
Refills: 0 | Status: ACTIVE | COMMUNITY

## 2023-04-11 RX ORDER — LINACLOTIDE 290 UG/1
290 CAPSULE, GELATIN COATED ORAL
Qty: 30 | Refills: 5 | Status: DISCONTINUED | COMMUNITY
Start: 2021-12-29 | End: 2023-04-11

## 2023-04-11 RX ORDER — CARBIDOPA AND LEVODOPA 25; 100 MG/1; MG/1
25-100 TABLET ORAL
Refills: 0 | Status: ACTIVE | COMMUNITY

## 2023-04-11 NOTE — PHYSICAL EXAM
[Ambulatory and capable of all self care but unable to carry out any work activities] : Status 2- Ambulatory and capable of all self care but unable to carry out any work activities. Up and about more than 50% of waking hours [Normal] : affect appropriate [de-identified] : pleasant elderly lady looks well  [de-identified] : walks with walker

## 2023-04-11 NOTE — HISTORY OF PRESENT ILLNESS
[de-identified] : 89 y/o female with hx of parkinsonism and vertigo admitted to  in March 2023 s/p mechanical trip and fall. 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 It is NOT c/w low grade  neuroendocrine tumor carcinoid \par \par She is here with her daughter, son in law ( neurologist)  and granddaughters.\par Lives with / granddaughter.\par Feels well. No  new pulmonary issues. Weight stable. No pain. \par Walks with walker ( not new) Chronic  balance issues due to parkinsonism, maybe little worse lately.

## 2023-04-11 NOTE — REASON FOR VISIT
[Follow-Up Visit] : a follow-up [FreeTextEntry2] : metastatic malignancy, here to discuss biopsy results

## 2023-04-11 NOTE — REVIEW OF SYSTEMS
[Patient Intake Form Reviewed] : Patient intake form was reviewed [Cough] : cough [Negative] : Allergic/Immunologic [FreeTextEntry6] : mild chronic cough for many years  [de-identified] : per HPI

## 2023-04-11 NOTE — ASSESSMENT
[FreeTextEntry1] : 89 y/o female with medical history of parkinsonism ,  remote former smoker now  found to have multiple bilat pulm nodules, liver metastases  and cerebellar met . 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  Rare variant of lung cancer -  treatment extrapolated from NSCL and occasionally small cell lung cancer protocols.\par \par Discussed palliative intent of all treatment interventions.\par Due to her advanced age- I do not think that chemotherapy will have positive impact on her QoL but she might benefit from targeted therapy if tumor harbors  actionable mutations / or immunotherapy if high TMB/ or PDL1 expression.\par \par Will obtain [facility] molecular profile and PDL1.\par \par Also recommend radiation oncology evaluation  to discuss option of stereotactic RT to cerebellar met.\par \par Discussed in details with patient   and her family. Answered questions.\par \par She will return in 2 weeks to discuss [facility] PDL1 results/ systemic tx options.

## 2023-04-11 NOTE — RESULTS/DATA
[FreeTextEntry1] : reviewed hospital albs and scans \par reviewed pathology and discussed with  pathologist Dr Carr

## 2023-04-12 DIAGNOSIS — C7A.8 OTHER MALIGNANT NEUROENDOCRINE TUMORS: ICD-10-CM

## 2023-04-12 DIAGNOSIS — C34.90 MALIGNANT NEOPLASM OF UNSPECIFIED PART OF UNSPECIFIED BRONCHUS OR LUNG: ICD-10-CM

## 2023-04-14 ENCOUNTER — APPOINTMENT (OUTPATIENT)
Dept: RADIATION ONCOLOGY | Facility: CLINIC | Age: 88
End: 2023-04-14
Payer: MEDICARE

## 2023-04-14 ENCOUNTER — NON-APPOINTMENT (OUTPATIENT)
Age: 88
End: 2023-04-14

## 2023-04-14 VITALS
HEIGHT: 66 IN | DIASTOLIC BLOOD PRESSURE: 62 MMHG | HEART RATE: 82 BPM | SYSTOLIC BLOOD PRESSURE: 110 MMHG | OXYGEN SATURATION: 94 % | BODY MASS INDEX: 20.09 KG/M2 | RESPIRATION RATE: 17 BRPM | WEIGHT: 125 LBS

## 2023-04-14 PROCEDURE — 99204 OFFICE O/P NEW MOD 45 MIN: CPT

## 2023-04-14 RX ORDER — ERYTHROMYCIN 500 MG/1
500 TABLET, DELAYED RELEASE ORAL
Qty: 10 | Refills: 0 | Status: DISCONTINUED | COMMUNITY
Start: 2022-10-27 | End: 2023-04-14

## 2023-04-14 RX ORDER — SERTRALINE 25 MG/1
25 TABLET, FILM COATED ORAL
Qty: 90 | Refills: 0 | Status: DISCONTINUED | COMMUNITY
Start: 2023-02-02 | End: 2023-04-14

## 2023-04-14 RX ORDER — MIRTAZAPINE 15 MG/1
15 TABLET, FILM COATED ORAL
Qty: 90 | Refills: 0 | Status: ACTIVE | COMMUNITY
Start: 2022-11-08

## 2023-04-14 RX ORDER — CARBIDOPA AND LEVODOPA 25; 100 MG/1; MG/1
25-100 TABLET ORAL
Qty: 360 | Refills: 0 | Status: DISCONTINUED | COMMUNITY
Start: 2022-09-14 | End: 2023-04-14

## 2023-04-14 NOTE — HISTORY OF PRESENT ILLNESS
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 Courtney. \par \par She was admitted to  in March 2023 s/p fall. \par \par Inpatient CT C/A/P performed on 3/21/23 showed numerous bilateral pulmonary nodules suspicious for metastatic disease. Largest is in the left lower lobe and measures 1.7 cm. Next largest is in the right upper lobe and measures 1.1 cm. There are several hepatic lesions which are indeterminate. Suspicious for metastatic disease. \par \par Inpatient CT cervical spine, brain, and maxillofacial performed on 3/21/23 showed a nonspecific focus of increased density within the anterior stella without significant adjacent or mass effect. Question collapse of the left posterior nasopharynx, however underlying mass cannot be excluded. \par \par MRI abdomen performed on 3/23/23 showed multiple bilobar liver metastases. Indeterminate focus of enhancement in the left posterior iliac bone. \par \par MRI brain performed on 3/23/23 showed 1.5 cm  left cerebellar metastases. Punctate lesion right basal ganglia also likely metastasis. Left ventricle pontine lesion most likely representing cavernous angioma. \par \par CT guided liver biopsy performed on 3/30/23 showed intermediate to large peter neuroendocrine poorly differentiated carcinoma with occasional necrosis, IHC favors pulmonary origin. \par \par [facility] ordered by Dr. Courtney on 4/11/23. \par \par Feeling well. Denies weight loss. Denies SOB. Chronic cough noted. Denies headache, nausea or vomiting.  Denies visual disturbances. Denies trouble with coordination. Uses a walker due to Parkinsons. \par \par She presents to discuss the role of radiation therapy in her care.

## 2023-04-14 NOTE — VITALS
[Maximal Pain Intensity: 0/10] : 0/10 [80: Normal activity with effort; some signs or symptoms of disease.] : 80: Normal activity with effort; some signs or symptoms of disease.  [Date: ____________] : Patient's last distress assessment performed on [unfilled]. [0 - No Distress] : Distress Level: 0

## 2023-04-14 NOTE — DISEASE MANAGEMENT
[Clinical] : TNM Stage: c [IV] : IV [FreeTextEntry4] : large cell neuroendocrine cancer [TTNM] : - [NTNM] : - [MTNM] : 1

## 2023-04-18 PROCEDURE — 77263 THER RADIOLOGY TX PLNG CPLX: CPT

## 2023-04-18 PROCEDURE — 77290 THER RAD SIMULAJ FIELD CPLX: CPT

## 2023-04-18 PROCEDURE — 77334 RADIATION TREATMENT AID(S): CPT

## 2023-04-20 PROCEDURE — 77370 RADIATION PHYSICS CONSULT: CPT

## 2023-04-21 PROCEDURE — 77300 RADIATION THERAPY DOSE PLAN: CPT

## 2023-04-21 PROCEDURE — 77295 3-D RADIOTHERAPY PLAN: CPT

## 2023-04-21 PROCEDURE — 77334 RADIATION TREATMENT AID(S): CPT

## 2023-04-24 ENCOUNTER — NON-APPOINTMENT (OUTPATIENT)
Age: 88
End: 2023-04-24

## 2023-04-24 VITALS
OXYGEN SATURATION: 95 % | DIASTOLIC BLOOD PRESSURE: 72 MMHG | SYSTOLIC BLOOD PRESSURE: 114 MMHG | RESPIRATION RATE: 18 BRPM | HEART RATE: 90 BPM

## 2023-04-24 PROCEDURE — 77373 STRTCTC BDY RAD THER TX DLVR: CPT

## 2023-04-24 NOTE — HISTORY OF PRESENT ILLNESS
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 Courtney. \par \par She was admitted to  in March 2023 s/p fall. \par \par Inpatient CT C/A/P performed on 3/21/23 showed numerous bilateral pulmonary nodules suspicious for metastatic disease. Largest is in the left lower lobe and measures 1.7 cm. Next largest is in the right upper lobe and measures 1.1 cm. There are several hepatic lesions which are indeterminate. Suspicious for metastatic disease. \par \par Inpatient CT cervical spine, brain, and maxillofacial performed on 3/21/23 showed a nonspecific focus of increased density within the anterior stella without significant adjacent or mass effect. Question collapse of the left posterior nasopharynx, however underlying mass cannot be excluded. \par \par MRI abdomen performed on 3/23/23 showed multiple bilobar liver metastases. Indeterminate focus of enhancement in the left posterior iliac bone. \par \par MRI brain performed on 3/23/23 showed 1.5 cm  left cerebellar metastases. Punctate lesion right basal ganglia also likely metastasis. Left ventricle pontine lesion most likely representing cavernous angioma. \par \par CT guided liver biopsy performed on 3/30/23 showed intermediate to large peter neuroendocrine poorly differentiated carcinoma with occasional necrosis, IHC favors pulmonary origin. \par \par FoundationOne ordered by Dr. Courtney on 4/11/23. \par \par Feeling well. Denies weight loss. Denies SOB. Chronic cough noted. Denies headache, nausea or vomiting.  Denies visual disturbances. Denies trouble with coordination. Uses a walker due to Parkinsons. \par \par She presents for an OTV 1/3 fx. Taking Decadron 4mg BID.  Feeling well.  No complaints.

## 2023-04-24 NOTE — DISEASE MANAGEMENT
[Clinical] : TNM Stage: c [IV] : IV [TTNM] : - [FreeTextEntry4] : large cell neuroendocrine cancer [NTNM] : - [MTNM] : 1 [de-identified] : 900 [de-identified] : 9309 [de-identified] : Left Cerebellar Met

## 2023-04-24 NOTE — REASON FOR VISIT
[Routine On-Treatment] : a routine on-treatment visit for [Brain Metastasis] : brain metastasis [Other: _____] : [unfilled]

## 2023-04-26 ENCOUNTER — APPOINTMENT (OUTPATIENT)
Dept: HEMATOLOGY ONCOLOGY | Facility: CLINIC | Age: 88
End: 2023-04-26
Payer: MEDICARE

## 2023-04-26 ENCOUNTER — TRANSCRIPTION ENCOUNTER (OUTPATIENT)
Age: 88
End: 2023-04-26

## 2023-04-26 VITALS
OXYGEN SATURATION: 96 % | HEART RATE: 83 BPM | TEMPERATURE: 98 F | SYSTOLIC BLOOD PRESSURE: 163 MMHG | DIASTOLIC BLOOD PRESSURE: 83 MMHG | RESPIRATION RATE: 18 BRPM

## 2023-04-26 DIAGNOSIS — C79.31 MALIGNANT NEOPLASM OF UNSPECIFIED PART OF UNSPECIFIED BRONCHUS OR LUNG: ICD-10-CM

## 2023-04-26 DIAGNOSIS — C7A.8 OTHER MALIGNANT NEUROENDOCRINE TUMORS: ICD-10-CM

## 2023-04-26 DIAGNOSIS — C34.90 MALIGNANT NEOPLASM OF UNSPECIFIED PART OF UNSPECIFIED BRONCHUS OR LUNG: ICD-10-CM

## 2023-04-26 PROCEDURE — 99215 OFFICE O/P EST HI 40 MIN: CPT

## 2023-04-26 PROCEDURE — 77373 STRTCTC BDY RAD THER TX DLVR: CPT

## 2023-04-27 PROBLEM — C34.90 LUNG CANCER METASTATIC TO BRAIN: Status: ACTIVE | Noted: 2023-04-07

## 2023-04-27 PROBLEM — C7A.8 NEUROENDOCRINE CARCINOMA OF LUNG: Status: ACTIVE | Noted: 2023-04-07

## 2023-04-27 NOTE — PHYSICAL EXAM
[Ambulatory and capable of all self care but unable to carry out any work activities] : Status 2- Ambulatory and capable of all self care but unable to carry out any work activities. Up and about more than 50% of waking hours [Normal] : affect appropriate [de-identified] : pleasant elderly lady in wheelchair  [de-identified] : walks with walker

## 2023-04-27 NOTE — ASSESSMENT
[FreeTextEntry1] : 89 y/o female with medical history of parkinsonism ,  remote former smoker now  found to have multiple bilat pulm nodules, liver metastases  and cerebellar met . 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  Rare variant of lung cancer -  treatment extrapolated from NSCL and occasionally small cell lung cancer protocols.\par \par Discussed palliative intent of all treatment interventions.\par \par Currently receiving SRS to symptomatic large cerebellar met.\par \par PDL1 TPS 0,  TMB 1  MSI stable- unlikely to benefit from immunotherapy.\par FoundationOne- no actionable mutations\par Due to her advanced age- I do not think that chemotherapy will have positive impact on her QoL\par \par Discussed palliative care/ home hospice.\par Patient / family agree but feel that she does not need home hospice yet. \par She has very poor balance - she is able to walk with walker but needs supervision of another person - at very high fall risk.\par Her 89 y/o  is not able to support her.\par Needs 24 hour home care.\par Will refer to social work\par  \par Patient/ family will contact  me when they would   like home hospice referral in the future.\par Answered questions re expected course of disease/ life expectancy. \par \par D/w patient / her daughter and son-in-law on the phone.

## 2023-04-27 NOTE — HISTORY OF PRESENT ILLNESS
[Disease: _____________________] : Disease: [unfilled] [M: ___] : M[unfilled] [AJCC Stage: ____] : AJCC Stage: [unfilled] [de-identified] : 89 y/o female with hx of parkinsonism and vertigo admitted to  in March 2023 s/p mechanical trip and fall. 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 It is NOT c/w low grade  neuroendocrine tumor carcinoid \par \par Currently receiving RSR to large and symptomatic cerebellar met ( today treatment 2/3) \par \par  [de-identified] : large cell neuroendocrine poorly differentiated  [de-identified] : PDL1 TPS  0\par TMB 1 \par MSI stable \par FoundationOne - no actionable mutations  [de-identified] : She is here with her daughter and granddaughter, her son in law ( neurologist)- on the phone. \par Lives with / granddaughter.\par Denies any pulmonary c/o.\par Poor balance- walks with walker but needs supervision of one person ( fall risk ). \par Appetite is good. No pain.

## 2023-04-27 NOTE — REASON FOR VISIT
[Follow-Up Visit] : a follow-up [FreeTextEntry2] : metastatic NSCLC ( large cell neuroendocrine) here to discuss results of FoundationOne testing

## 2023-04-27 NOTE — REVIEW OF SYSTEMS
[Patient Intake Form Reviewed] : Patient intake form was reviewed [Cough] : cough [Negative] : Allergic/Immunologic [FreeTextEntry6] : mild chronic cough for many years  [de-identified] : per HPI

## 2023-04-28 PROCEDURE — 77373 STRTCTC BDY RAD THER TX DLVR: CPT

## 2023-04-28 PROCEDURE — 77336 RADIATION PHYSICS CONSULT: CPT

## 2023-04-28 PROCEDURE — 77435 SBRT MANAGEMENT: CPT

## 2023-05-01 ENCOUNTER — NON-APPOINTMENT (OUTPATIENT)
Age: 88
End: 2023-05-01

## 2023-05-03 ENCOUNTER — NON-APPOINTMENT (OUTPATIENT)
Age: 88
End: 2023-05-03

## 2023-05-22 NOTE — REASON FOR VISIT
[Post-Treatment Evaluation] : post-treatment evaluation for [Brain Metastasis] : brain metastasis [Other: _____] : [unfilled]

## 2023-05-26 ENCOUNTER — APPOINTMENT (OUTPATIENT)
Dept: RADIATION ONCOLOGY | Facility: CLINIC | Age: 88
End: 2023-05-26
Payer: MEDICARE

## 2023-05-26 VITALS
RESPIRATION RATE: 18 BRPM | OXYGEN SATURATION: 95 % | DIASTOLIC BLOOD PRESSURE: 68 MMHG | HEART RATE: 85 BPM | BODY MASS INDEX: 20.41 KG/M2 | WEIGHT: 127 LBS | HEIGHT: 66 IN | SYSTOLIC BLOOD PRESSURE: 116 MMHG

## 2023-05-26 PROCEDURE — 99024 POSTOP FOLLOW-UP VISIT: CPT

## 2023-05-26 NOTE — REVIEW OF SYSTEMS
[Fatigue: Grade 0] : Fatigue: Grade 0 [Cognitive Disturbance: Grade 0] : Cognitive Disturbance: Grade 0 [Dizziness: Grade 1 - Mild unsteadiness or sensation of movement] : Dizziness: Grade 1 - Mild unsteadiness or sensation of movement [Headache: Grade 0] : Headache: Grade 0 [Skin Hyperpigmentation: Grade 0] : Skin Hyperpigmentation: Grade 0 [Dermatitis Radiation: Grade 0] : Dermatitis Radiation: Grade 0 [FreeTextEntry3] : occasional

## 2023-05-26 NOTE — HISTORY OF PRESENT ILLNESS
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 Courtney. \par \par She was admitted to  in March 2023 s/p fall. \par \par Inpatient CT C/A/P performed on 3/21/23 showed numerous bilateral pulmonary nodules suspicious for metastatic disease. Largest is in the left lower lobe and measures 1.7 cm. Next largest is in the right upper lobe and measures 1.1 cm. There are several hepatic lesions which are indeterminate. Suspicious for metastatic disease. \par \par Inpatient CT cervical spine, brain, and maxillofacial performed on 3/21/23 showed a nonspecific focus of increased density within the anterior stella without significant adjacent or mass effect. Question collapse of the left posterior nasopharynx, however underlying mass cannot be excluded. \par \par MRI abdomen performed on 3/23/23 showed multiple bilobar liver metastases. Indeterminate focus of enhancement in the left posterior iliac bone. \par \par MRI brain performed on 3/23/23 showed 1.5 cm  left cerebellar metastases. Punctate lesion right basal ganglia also likely metastasis. Left ventricle pontine lesion most likely representing cavernous angioma. \par \par CT guided liver biopsy performed on 3/30/23 showed intermediate to large peter neuroendocrine poorly differentiated carcinoma with occasional necrosis, IHC favors pulmonary origin. \par \par FoundationOne ordered by Dr. Courtney on 4/11/23. \par \par Feeling well. Denies weight loss. Denies SOB. Chronic cough noted. Denies headache, nausea or vomiting.  Denies visual disturbances. Denies trouble with coordination. Uses a walker due to Parkinsons. \par \par She was assessed to have newly diagnosed stage IV NSCLC with large cell neuroendocrine variant. Neuroimaging shows a 1.5 cm metastasis in the left cerebellum. There is also a punctate focus in right putamen and a small pontine cavernous angioma. [place] is asymptomatic at this time.\par \par She completed fSRS, a dose of 2700 cGy in 3 fx, to the left cerebellar metastasis on 4/28/23. \par \par She presents for a one month PTE.  Feeling well. She noted occasional double vision since two weeks ago and occasional dizziness. Patient reports feeling more steady on her feet. She denies confusion, nausea and vomiting. She completed Decadron taper two weeks post RT.  She is doing generally well. Patient is sleeping and eating well.  Has transferred her Medical Oncology care to [facility]; no active systemic tx at this time.

## 2023-08-17 ENCOUNTER — APPOINTMENT (OUTPATIENT)
Dept: RADIATION ONCOLOGY | Facility: CLINIC | Age: 88
End: 2023-08-17
Payer: MEDICARE

## 2023-08-17 PROCEDURE — 99213 OFFICE O/P EST LOW 20 MIN: CPT | Mod: 95

## 2023-08-17 RX ORDER — DEXAMETHASONE 4 MG/1
4 TABLET ORAL
Qty: 30 | Refills: 0 | Status: DISCONTINUED | COMMUNITY
Start: 2023-04-14 | End: 2023-08-17

## 2023-08-17 NOTE — DISEASE MANAGEMENT
[Clinical] : TNM Stage: c [IV] : IV [FreeTextEntry4] : large cell neuroendocrine cancer [TTNM] : - [MTNM] : 1 [NTNM] : -

## 2023-08-17 NOTE — HISTORY OF PRESENT ILLNESS
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 Courtney.   She was admitted to  in March 2023 s/p fall.   Inpatient CT C/A/P performed on 3/21/23 showed numerous bilateral pulmonary nodules suspicious for metastatic disease. Largest is in the left lower lobe and measures 1.7 cm. Next largest is in the right upper lobe and measures 1.1 cm. There are several hepatic lesions which are indeterminate. Suspicious for metastatic disease.   Inpatient CT cervical spine, brain, and maxillofacial performed on 3/21/23 showed a nonspecific focus of increased density within the anterior stella without significant adjacent or mass effect. Question collapse of the left posterior nasopharynx, however underlying mass cannot be excluded.   MRI abdomen performed on 3/23/23 showed multiple bilobar liver metastases. Indeterminate focus of enhancement in the left posterior iliac bone.   MRI brain performed on 3/23/23 showed 1.5 cm  left cerebellar metastases. Punctate lesion right basal ganglia also likely metastasis. Left ventricle pontine lesion most likely representing cavernous angioma.   CT guided liver biopsy performed on 3/30/23 showed intermediate to large peter neuroendocrine poorly differentiated carcinoma with occasional necrosis, IHC favors pulmonary origin.   FoundationOne ordered by Dr. Courtney on 4/11/23.   Feeling well. Denies weight loss. Denies SOB. Chronic cough noted. Denies headache, nausea or vomiting.  Denies visual disturbances. Denies trouble with coordination. Uses a walker due to Parkinsons.   She was assessed to have newly diagnosed stage IV NSCLC with large cell neuroendocrine variant. Neuroimaging shows a 1.5 cm metastasis in the left cerebellum. There is also a punctate focus in right putamen and a small pontine cavernous angioma. [place] is asymptomatic at this time.  She completed fSRS, a dose of 2700 cGy in 3 fx, to the left cerebellar metastasis on 4/28/23.   She presents for a 4 month follow up. Feeling well. No longer has occasional double vision and occasional dizziness. Slight left sided headache noted. Taking Tylenol. Patient reports feeling more steady on her feet. She denies confusion, nausea and vomiting. She completed Decadron taper post RT.  She is doing generally well. Patient is sleeping and eating well.  [place] [facility] no active systemic tx at this time. Refusing further imaging.

## 2023-08-17 NOTE — REASON FOR VISIT
[Post-Treatment Evaluation] : post-treatment evaluation for [Brain Metastasis] : brain metastasis [Other: _____] : [unfilled] [Routine Follow-Up] : routine follow-up visit for [Home] : at home, [unfilled] , at the time of the visit. [Patient] : the patient [Medical Office: ([facility])___] : at the medical office located in  [Family Member] : family member

## 2024-06-24 NOTE — ED ADULT NURSE NOTE - NSSEPSISSUSPECTED_ED_A_ED
No
Continue Regimen: minoxidil 2.5 mg tablet: Take one tab po QDAY
Plan: On minoxidil for about a year, and has noticeable improvement on vertex though frontal hairline still thin.  Continue minoxidil 2.5mg daily.
Render In Strict Bullet Format?: No
Detail Level: Zone

## 2024-10-20 ENCOUNTER — EMERGENCY (EMERGENCY)
Facility: HOSPITAL | Age: 89
LOS: 0 days | Discharge: HOME | End: 2024-10-20
Attending: EMERGENCY MEDICINE
Payer: MEDICARE

## 2024-10-20 VITALS
SYSTOLIC BLOOD PRESSURE: 167 MMHG | HEART RATE: 85 BPM | DIASTOLIC BLOOD PRESSURE: 99 MMHG | RESPIRATION RATE: 20 BRPM | OXYGEN SATURATION: 98 % | TEMPERATURE: 97 F

## 2024-10-20 VITALS
OXYGEN SATURATION: 96 % | RESPIRATION RATE: 16 BRPM | SYSTOLIC BLOOD PRESSURE: 192 MMHG | WEIGHT: 134.92 LBS | DIASTOLIC BLOOD PRESSURE: 92 MMHG | HEART RATE: 76 BPM | TEMPERATURE: 98 F | HEIGHT: 64 IN

## 2024-10-20 DIAGNOSIS — Z88.0 ALLERGY STATUS TO PENICILLIN: ICD-10-CM

## 2024-10-20 DIAGNOSIS — R29.898 OTHER SYMPTOMS AND SIGNS INVOLVING THE MUSCULOSKELETAL SYSTEM: ICD-10-CM

## 2024-10-20 DIAGNOSIS — C78.7 SECONDARY MALIGNANT NEOPLASM OF LIVER AND INTRAHEPATIC BILE DUCT: ICD-10-CM

## 2024-10-20 DIAGNOSIS — R47.81 SLURRED SPEECH: ICD-10-CM

## 2024-10-20 DIAGNOSIS — Z96.641 PRESENCE OF RIGHT ARTIFICIAL HIP JOINT: Chronic | ICD-10-CM

## 2024-10-20 DIAGNOSIS — C34.90 MALIGNANT NEOPLASM OF UNSPECIFIED PART OF UNSPECIFIED BRONCHUS OR LUNG: ICD-10-CM

## 2024-10-20 DIAGNOSIS — R29.810 FACIAL WEAKNESS: ICD-10-CM

## 2024-10-20 DIAGNOSIS — R91.1 SOLITARY PULMONARY NODULE: ICD-10-CM

## 2024-10-20 DIAGNOSIS — C79.31 SECONDARY MALIGNANT NEOPLASM OF BRAIN: ICD-10-CM

## 2024-10-20 DIAGNOSIS — Z90.710 ACQUIRED ABSENCE OF BOTH CERVIX AND UTERUS: Chronic | ICD-10-CM

## 2024-10-20 DIAGNOSIS — G20.A1 PARKINSON'S DISEASE WITHOUT DYSKINESIA, WITHOUT MENTION OF FLUCTUATIONS: ICD-10-CM

## 2024-10-20 LAB
APTT BLD: 30.8 SEC — SIGNIFICANT CHANGE UP (ref 24.5–35.6)
BASOPHILS # BLD AUTO: 0.04 K/UL — SIGNIFICANT CHANGE UP (ref 0–0.2)
BASOPHILS NFR BLD AUTO: 0.7 % — SIGNIFICANT CHANGE UP (ref 0–2)
EOSINOPHIL # BLD AUTO: 0.17 K/UL — SIGNIFICANT CHANGE UP (ref 0–0.5)
EOSINOPHIL NFR BLD AUTO: 3 % — SIGNIFICANT CHANGE UP (ref 0–6)
HCT VFR BLD CALC: 38.5 % — SIGNIFICANT CHANGE UP (ref 34.5–45)
HGB BLD-MCNC: 12.3 G/DL — SIGNIFICANT CHANGE UP (ref 11.5–15.5)
IMM GRANULOCYTES NFR BLD AUTO: 0.4 % — SIGNIFICANT CHANGE UP (ref 0–0.9)
INR BLD: 0.98 RATIO — SIGNIFICANT CHANGE UP (ref 0.85–1.16)
LYMPHOCYTES # BLD AUTO: 0.92 K/UL — LOW (ref 1–3.3)
LYMPHOCYTES # BLD AUTO: 16.2 % — SIGNIFICANT CHANGE UP (ref 13–44)
MCHC RBC-ENTMCNC: 29.1 PG — SIGNIFICANT CHANGE UP (ref 27–34)
MCHC RBC-ENTMCNC: 31.9 GM/DL — LOW (ref 32–36)
MCV RBC AUTO: 91 FL — SIGNIFICANT CHANGE UP (ref 80–100)
MONOCYTES # BLD AUTO: 0.59 K/UL — SIGNIFICANT CHANGE UP (ref 0–0.9)
MONOCYTES NFR BLD AUTO: 10.4 % — SIGNIFICANT CHANGE UP (ref 2–14)
NEUTROPHILS # BLD AUTO: 3.93 K/UL — SIGNIFICANT CHANGE UP (ref 1.8–7.4)
NEUTROPHILS NFR BLD AUTO: 69.3 % — SIGNIFICANT CHANGE UP (ref 43–77)
PLATELET # BLD AUTO: 202 K/UL — SIGNIFICANT CHANGE UP (ref 150–400)
PROTHROM AB SERPL-ACNC: 11.3 SEC — SIGNIFICANT CHANGE UP (ref 9.9–13.4)
RBC # BLD: 4.23 M/UL — SIGNIFICANT CHANGE UP (ref 3.8–5.2)
RBC # FLD: 14.7 % — HIGH (ref 10.3–14.5)
WBC # BLD: 5.67 K/UL — SIGNIFICANT CHANGE UP (ref 3.8–10.5)
WBC # FLD AUTO: 5.67 K/UL — SIGNIFICANT CHANGE UP (ref 3.8–10.5)

## 2024-10-20 PROCEDURE — 70496 CT ANGIOGRAPHY HEAD: CPT | Mod: MC

## 2024-10-20 PROCEDURE — 86900 BLOOD TYPING SEROLOGIC ABO: CPT

## 2024-10-20 PROCEDURE — 82962 GLUCOSE BLOOD TEST: CPT

## 2024-10-20 PROCEDURE — 70498 CT ANGIOGRAPHY NECK: CPT | Mod: MC

## 2024-10-20 PROCEDURE — 36415 COLL VENOUS BLD VENIPUNCTURE: CPT

## 2024-10-20 PROCEDURE — 84484 ASSAY OF TROPONIN QUANT: CPT

## 2024-10-20 PROCEDURE — 85730 THROMBOPLASTIN TIME PARTIAL: CPT

## 2024-10-20 PROCEDURE — 80053 COMPREHEN METABOLIC PANEL: CPT

## 2024-10-20 PROCEDURE — 0042T: CPT | Mod: MC

## 2024-10-20 PROCEDURE — 85610 PROTHROMBIN TIME: CPT

## 2024-10-20 PROCEDURE — 86850 RBC ANTIBODY SCREEN: CPT

## 2024-10-20 PROCEDURE — 70450 CT HEAD/BRAIN W/O DYE: CPT | Mod: XU,MC

## 2024-10-20 PROCEDURE — 71045 X-RAY EXAM CHEST 1 VIEW: CPT | Mod: 26

## 2024-10-20 PROCEDURE — 85025 COMPLETE CBC W/AUTO DIFF WBC: CPT

## 2024-10-20 PROCEDURE — 70496 CT ANGIOGRAPHY HEAD: CPT | Mod: 26,MC

## 2024-10-20 PROCEDURE — 86901 BLOOD TYPING SEROLOGIC RH(D): CPT

## 2024-10-20 PROCEDURE — 93005 ELECTROCARDIOGRAM TRACING: CPT

## 2024-10-20 PROCEDURE — 99285 EMERGENCY DEPT VISIT HI MDM: CPT | Mod: 25

## 2024-10-20 PROCEDURE — 93010 ELECTROCARDIOGRAM REPORT: CPT

## 2024-10-20 PROCEDURE — 0042T: CPT

## 2024-10-20 PROCEDURE — 99285 EMERGENCY DEPT VISIT HI MDM: CPT

## 2024-10-20 PROCEDURE — 70498 CT ANGIOGRAPHY NECK: CPT | Mod: 26,MC

## 2024-10-20 PROCEDURE — 70450 CT HEAD/BRAIN W/O DYE: CPT | Mod: 26,59,MC

## 2024-10-20 PROCEDURE — 71045 X-RAY EXAM CHEST 1 VIEW: CPT

## 2024-10-20 NOTE — ED PROVIDER NOTE - PATIENT PORTAL LINK FT
You can access the FollowMyHealth Patient Portal offered by [facility] by registering at the following website: [email or web address]. By joining [facility] FollowMyHealth portal, you will also be able to view your health information using other applications (apps) compatible with our system.

## 2024-10-20 NOTE — ED ADULT NURSE NOTE - CHIEF COMPLAINT QUOTE
CARLOS A at [facility] to return call. It looks like the Rasuvo was approved until 11/29/2022 and a script was sent on 11/23/2021 with 2 refill. Asked to return call if needed.   Pt presents from home for facial droop, slurred speech, and right sided weakness that began at 8:20pm. Symptoms have since resolved, no slurred speech, facial droop or weakness noted upon assessment. Strength intact bilaterally, no drift noted. 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 MD Simmons notified. Code stroke called [id number]

## 2024-10-20 NOTE — ED ADULT NURSE NOTE - NSFALLHARMRISKINTERV_ED_ALL_ED
Assistance OOB with selected safe patient handling equipment if applicable/Communicate risk of Fall with Harm to all staff, patient, and family/Monitor gait and stability/Provide patient with walking aids/Provide visual cue: red socks, yellow wristband, yellow gown, etc/Reinforce activity limits and safety measures with patient and family/Bed in lowest position, wheels locked, appropriate side rails in place/Call bell, personal items and telephone in reach/Instruct patient to call for assistance before getting out of bed/chair/stretcher/Non-slip footwear applied when patient is off stretcher/[place] to call system/Physically safe environment - no spills, clutter or unnecessary equipment/Purposeful Proactive Rounding/Room/bathroom lighting operational, light cord in reach

## 2024-10-20 NOTE — ED ADULT TRIAGE NOTE - BP NONINVASIVE DIASTOLIC (MM HG)
[place] EMERGENCY DEPARTMENT ([facility])    9/28/23       ED PROVIDER NOTE   Vertical Triage A 11:47 AM   History     Chief Complaint   Patient presents with    Altered Mental Status    Fall     The history is provided by the patient, medical records and a relative (Daughter).     Isadora Mendez is a 89 year old female with history of Graves' disease, hypothyroidism, pulmonary hypertension who presents to the emergency department with fall with head injury and confusion.  Patient is accompanied by daughter who presents history.  Daughter states that patient had hospitalization several months ago for thyrotoxicosis and was started on methimazole.  She has had routine lab work on an outpatient basis, was briefly discontinued off the methimazole but then restarted while she had some lower lab values.  She was scheduled for a lab recheck today as it has been 2 weeks since her recent change in dosing of the methimazole.  Daughter went to go pick her up, but patient tripped on the edge of a rug and fell forward, striking her nose.  She developed a nosebleed with this.  No loss of consciousness, they were able to get the bleeding to stop and daughter continued to drive patient here for lab draw.  However in the car patient seemed confused, kept asking where they were headed and why they were going there unusual for patient.  Daughter states at baseline patient is very sharp, is not using is able to get an accurate knowledge of events.  Here patient states that she came in because her blood pressures have been higher, ranged from 160-180 last night and has not gone back down.  Daughter states that this is inaccurate, that she brought her in because of continued confusion.  Patient has not returned to her cognitive baseline, though her confusion has improved a little bit.  Daughter notes that initially patient denied taking her medications but then later recanted and said that she did take her medications today.   Patient has been more consistent with this answer, and reiterates that she did not take her meds today.  She notes that her nose felt a little flat earlier, but this seems improved.  She denies any pain to her head, extremities or neck, denies any other injuries.  Daughter is a physical therapist, states that she did do a physical assessment when patient fell, did not find any acute injuries other than the nosebleed. She is not on anticoagulation; daughter states that her doctors have wanted to start her on anticoagulation but she has refused, wanted to treat her medical issues with supplements.  Daughter states that it was believed that one of the supplements was contributed to her thyroid issues.      Past Medical History  Past Medical History:   Diagnosis Date    Atrial fibrillation (H) 01/01/2011    Cataract     Closed nondisplaced fracture of styloid process of radius     Dry eyes     Facial fracture (H) 01/01/2006    Hypertension     Infection due to 2019 novel coronavirus 10/2022    or 11/22- took paxolovid    Low bone density     NSVT (nonsustained ventricular tachycardia) (H) 01/01/2009    during exercise echo, neg EP study    Pacemaker 07/01/2010    Postmenopausal status     S/P cardiac catheterization 01/01/2009    30-40% non obstructive lesion    SSS (sick sinus syndrome) (H) 10/2022    Ventricular tachycardia, nonsustained (H) 01/01/2009    during stress echo     Past Surgical History:   Procedure Laterality Date    CATARACT EXTRACTION W/ INTRAOCULAR LENS IMPLANT Right 12/04/2018    IMPLANT PACEMAKER      PPM  06/30/2010    Permanent Pacemaker     aspirin 81 MG tablet  famotidine (PEPCID) 20 MG tablet  lisinopril (ZESTRIL) 20 MG tablet  melatonin 1 MG/ML LIQD liquid  methimazole (TAPAZOLE) 5 MG tablet  metoprolol succinate ER (TOPROL XL) 50 MG 24 hr tablet  spironolactone (ALDACTONE) 25 MG tablet  triamcinolone (KENALOG) 0.1 % external cream      No Known Allergies  Family History  Family History    Problem Relation Age of Onset    Myocardial Infarction Mother 88        lived to 100    Hypertension Mother     Macular Degeneration Mother     Cardiovascular Father 76         age 80, MI 76 and CHF 80's    Coronary Artery Disease Father     Myocardial Infarction Father 70    Arrhythmia Sister         PPM    Neuropathy Sister     Colon Cancer Sister     Arrhythmia Brother         pacemaker    Arrhythmia Brother         pacemaker, hx rheumatic fever, heart failure    Leukemia Maternal Grandfather     Diabetes Type 2  Maternal Grandfather     Cardiovascular Paternal Grandfather          age 76 of CVD    Coronary Artery Disease Paternal Grandfather     Breast Cancer Daughter 50        Breast, uterine, 2nd breast cancer, HTN    Uterine Cancer Daughter     Thyroid Disease Daughter         bout of thyroiditis with thyrotoxicosis followed by hypothyroidism    Blood Disease Son         hemochromatosis?    Atrial fibrillation Son      Social History   Social History     Tobacco Use    Smoking status: Never    Smokeless tobacco: Never   Substance Use Topics    Alcohol use: No    Drug use: No         A medically appropriate review of systems was performed with pertinent positives and negatives noted in the HPI, and all other systems negative.    Physical Exam   BP: (!) 190/80  Pulse: 77  Temp: 97.6  F (36.4  C)  Resp: 16  SpO2: 98 %  Physical Exam  Constitutional:       General: She is not in acute distress.     Appearance: She is not diaphoretic.   HENT:      Head: Normocephalic and atraumatic.      Right Ear: External ear normal.      Left Ear: External ear normal.      Nose: Nose normal.   Eyes:      Extraocular Movements: Extraocular movements intact.      Conjunctiva/sclera: Conjunctivae normal.   Cardiovascular:      Rate and Rhythm: Normal rate and regular rhythm.      Heart sounds: Normal heart sounds.   Pulmonary:      Effort: Pulmonary effort is normal. No respiratory distress.      Breath sounds: Normal  breath sounds.   Abdominal:      General: There is no distension.      Palpations: Abdomen is soft.      Tenderness: There is no abdominal tenderness.   Musculoskeletal:         General: No swelling or deformity.      Cervical back: Normal range of motion and neck supple.   Skin:     General: Skin is warm and dry.   Neurological:      Mental Status: Mental status is at baseline.      Comments: Alert, oriented   Psychiatric:         Mood and Affect: Mood normal.         Behavior: Behavior normal.       ED Course, Procedures, & Data      Procedures         Results for orders placed or performed during the hospital encounter of 09/28/23   CT Head w/o Contrast     Status: None (Preliminary result)    Impression    RESIDENT PRELIMINARY INTERPRETATION  IMPRESSION:   1. No acute intracranial pathology.   2. 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     TSH     Status: Abnormal   Result Value Ref Range    TSH 0.03 (L) 0.30 - 4.20 uIU/mL   T4 free     Status: Abnormal   Result Value Ref Range    Free T4 1.78 (H) 0.90 - 1.70 ng/dL   T3 total     Status: Normal   Result Value Ref Range    T3 Total 123 85 - 202 ng/dL   [place] Draw     Status: None    Narrative    The following orders were created for panel order [place] Draw.  Procedure                               Abnormality         Status                     ---------                               -----------         ------                     Extra Blue Top Tube[[id number]]                              Final result               Extra Red Top Tube[[id number]]                               Final result               Extra Purple Top Tube[[id number]]                            Final result                 Please view results for these tests on the individual orders.   Extra Blue Top Tube     Status: None   Result Value Ref Range    Hold Specimen JIC    Extra Red Top Tube     Status: None   Result Value Ref Range    Hold Specimen JIC     Extra Purple Top Tube     Status: None   Result Value Ref Range    Hold Specimen JIC           Results for orders placed or performed during the hospital encounter of 09/28/23   CT Head w/o Contrast     Status: None (Preliminary result)    Impression    RESIDENT PRELIMINARY INTERPRETATION  IMPRESSION:   1. No acute intracranial pathology.   2. 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     TSH     Status: Abnormal   Result Value Ref Range    TSH 0.03 (L) 0.30 - 4.20 uIU/mL   T4 free     Status: Abnormal   Result Value Ref Range    Free T4 1.78 (H) 0.90 - 1.70 ng/dL   T3 total     Status: Normal   Result Value Ref Range    T3 Total 123 85 - 202 ng/dL   [place] Draw     Status: None    Narrative    The following orders were created for panel order [place] Draw.  Procedure                               Abnormality         Status                     ---------                               -----------         ------                     Extra Blue Top Tube[[id number]]                              Final result               Extra Red Top Tube[[id number]]                               Final result               Extra Purple Top Tube[[id number]]                            Final result                 Please view results for these tests on the individual orders.   Extra Blue Top Tube     Status: None   Result Value Ref Range    Hold Specimen JIC    Extra Red Top Tube     Status: None   Result Value Ref Range    Hold Specimen JIC    Extra Purple Top Tube     Status: None   Result Value Ref Range    Hold Specimen JIC      Medications - No data to display  Labs Ordered and Resulted from Time of ED Arrival to Time of ED Departure   TSH - Abnormal       Result Value    TSH 0.03 (*)    T4 FREE - Abnormal    Free T4 1.78 (*)    T3 TOTAL - Normal    T3 Total 123       CT Head w/o Contrast   Preliminary Result   RESIDENT PRELIMINARY INTERPRETATION   IMPRESSION:    1. No acute intracranial  pathology.    2. Unchanged chronic appearing infarctions in the left frontal and   left occipital.                Medical Decision Making  The patient's presentation is strongly suggestive of low complexity (an acute and uncomplicated illness or injury).    The patient's evaluation involved:  review of external note(s) from 3+ sources (Most recent H&P in addition to clinic and ED note)  review of 2 test result(s) ordered prior to this encounter (Most recent BMP and CBC)    The patient's management involved only low risk treatment.      Assessment & Plan    89-year-old female presents to us with a chief complaint of mechanical fall.  Daughter reported initial confusion.  Symptoms seem to be improving here.  No altered mental status here.  CT scan shows no abnormalities.  We did check her T3-T4 TSH as they were planning to do that today on the outpatient basis when the fall happened.  T3 and T4 roughly normal.  She will follow-up with her endocrinologist and return as needed.    I have reviewed the nursing notes. I have reviewed the findings, diagnosis, plan and need for follow up with the patient.    New Prescriptions    No medications on file       Final diagnoses:   Injury of head, initial encounter     I, Lilliana Lopez, am serving as a trained medical scribe to document services personally performed by Bradley Ortiz DO based on the provider's statements to me on September 28, 2023.  This document has been checked and approved by the attending provider.    I, Bradley Ortiz DO, was physically present and have reviewed and verified the accuracy of this note documented by Lilliana Lopez, medical scribe.      Bradley Ortiz DO   [facility] EMERGENCY DEPARTMENT  9/28/2023     Bradley Ortiz DO  09/28/23 4420     89

## 2024-10-20 NOTE — ED PROVIDER NOTE - PHYSICAL EXAMINATION
Patient awake, alert, orient x 3, no acute distress  Heart sounds within normal limits, regular rate rhythm, no murmur  Lungs are clear bilaterally  Abdomen soft, nontender, nondistended.  Extremities are well-perfused  Skin without rash  No neurological focal, motor, or sensory deficits.

## 2024-10-20 NOTE — ED ADULT TRIAGE NOTE - CHIEF COMPLAINT QUOTE
Pt presents from home for facial droop, slurred speech, and right sided weakness that began at 8:20pm. Symptoms have since resolved, no slurred speech, facial droop or weakness noted upon assessment. Strength intact bilaterally, no drift noted. 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 MD Simmons notified. Code stroke called [id number]

## 2024-10-20 NOTE — ED ADULT NURSE NOTE - OBJECTIVE STATEMENT
Pt presents to ed c/o of slurred speech. Pt daughter at bedside states the aid stated the pt had slurred speech around 8pm." On observation pt does not have slurred speech, as per daughter her speech sounds normal. No neuro deficit observed, no facial asymmetry, equal  strength bilaterally, no arm/leg drift, positive strength to all four extremities, and positive sensation. A&O x3. Pt presents to ed c/o of slurred speech. Pt daughter at bedside states the aid stated the pt had slurred speech around 8pm." On observation pt does not have slurred speech, as per daughter her speech sounds normal. No neuro deficit observed, no facial asymmetry, equal  strength bilaterally, no arm/leg drift, positive strength to all four extremities, and positive sensation. A&O x3. Pt on hospice care.

## 2024-10-20 NOTE — ED PROVIDER NOTE - CLINICAL SUMMARY MEDICAL DECISION MAKING FREE TEXT BOX
CT scan without large vessel occlusion, no bleed, does have findings consistent with patient's known metastases.  Per family patient is on hospice, is not currently undergoing any treatment for her cancer, and they are requesting that she be discharged home at this time.  Okay for discharge home at this time.  I asked if there was anything else we could do to help make patient comfortable today, but family just requesting discharge at this time.

## 2024-10-20 NOTE — ED PROVIDER NOTE - PROGRESS NOTE DETAILS
Saad Simmons: Family requesting that patient not be admitted. Is OK with obtaining blood work and urine and requesting subsequent discharge. Saad Baron for Dr. Simmons: Patient is on hospice, family does not want to wait for blood work or urine and would like patient to discharged back home at this time for comfort.

## 2024-10-21 LAB
ALBUMIN SERPL ELPH-MCNC: 3.2 G/DL — LOW (ref 3.3–5)
ALP SERPL-CCNC: 58 U/L — SIGNIFICANT CHANGE UP (ref 40–120)
ALT FLD-CCNC: 8 U/L — LOW (ref 12–78)
ANION GAP SERPL CALC-SCNC: 6 MMOL/L — SIGNIFICANT CHANGE UP (ref 5–17)
AST SERPL-CCNC: 4 U/L — LOW (ref 15–37)
BILIRUB SERPL-MCNC: 0.5 MG/DL — SIGNIFICANT CHANGE UP (ref 0.2–1.2)
BLD GP AB SCN SERPL QL: SIGNIFICANT CHANGE UP
BUN SERPL-MCNC: 12 MG/DL — SIGNIFICANT CHANGE UP (ref 7–23)
CALCIUM SERPL-MCNC: 9 MG/DL — SIGNIFICANT CHANGE UP (ref 8.5–10.1)
CHLORIDE SERPL-SCNC: 104 MMOL/L — SIGNIFICANT CHANGE UP (ref 96–108)
CO2 SERPL-SCNC: 31 MMOL/L — SIGNIFICANT CHANGE UP (ref 22–31)
CREAT SERPL-MCNC: 0.82 MG/DL — SIGNIFICANT CHANGE UP (ref 0.5–1.3)
EGFR: 67 ML/MIN/1.73M2 — SIGNIFICANT CHANGE UP
GLUCOSE SERPL-MCNC: 94 MG/DL — SIGNIFICANT CHANGE UP (ref 70–99)
POTASSIUM SERPL-MCNC: 3.5 MMOL/L — SIGNIFICANT CHANGE UP (ref 3.5–5.3)
POTASSIUM SERPL-SCNC: 3.5 MMOL/L — SIGNIFICANT CHANGE UP (ref 3.5–5.3)
PROT SERPL-MCNC: 7.4 GM/DL — SIGNIFICANT CHANGE UP (ref 6–8.3)
SODIUM SERPL-SCNC: 141 MMOL/L — SIGNIFICANT CHANGE UP (ref 135–145)
TROPONIN I, HIGH SENSITIVITY RESULT: 10.39 NG/L — SIGNIFICANT CHANGE UP

## 2024-10-22 NOTE — ED POST DISCHARGE NOTE - RESULT SUMMARY
2cm nodule R lung apex. Pt with h/o of metastatic cancer and per radiology impression, nodules were noted on prior CT from 2023. No intervention needed. -Jesus Michaels PA-C